# Patient Record
Sex: MALE | Race: WHITE | Employment: FULL TIME | URBAN - NONMETROPOLITAN AREA
[De-identification: names, ages, dates, MRNs, and addresses within clinical notes are randomized per-mention and may not be internally consistent; named-entity substitution may affect disease eponyms.]

---

## 2018-10-08 ENCOUNTER — HOSPITAL ENCOUNTER (EMERGENCY)
Age: 38
Discharge: HOME | End: 2018-10-08
Attending: EMERGENCY MEDICINE

## 2018-10-08 ENCOUNTER — HOSPITAL ENCOUNTER (EMERGENCY)
Age: 38
Discharge: HOME HEALTH | End: 2018-10-08

## 2018-10-08 VITALS
WEIGHT: 165 LBS | BODY MASS INDEX: 23.62 KG/M2 | DIASTOLIC BLOOD PRESSURE: 94 MMHG | SYSTOLIC BLOOD PRESSURE: 130 MMHG | OXYGEN SATURATION: 95 % | HEART RATE: 123 BPM | TEMPERATURE: 99.9 F | RESPIRATION RATE: 13 BRPM | HEIGHT: 70 IN

## 2018-10-08 VITALS
RESPIRATION RATE: 15 BRPM | OXYGEN SATURATION: 98 % | TEMPERATURE: 98.9 F | SYSTOLIC BLOOD PRESSURE: 115 MMHG | DIASTOLIC BLOOD PRESSURE: 63 MMHG | HEART RATE: 84 BPM

## 2018-10-08 DIAGNOSIS — M54.5 LOW BACK PAIN, UNSPECIFIED BACK PAIN LATERALITY, UNSPECIFIED CHRONICITY, WITH SCIATICA PRESENCE UNSPECIFIED: Primary | ICD-10-CM

## 2018-10-08 DIAGNOSIS — F19.10 DRUG ABUSE (HCC): Primary | ICD-10-CM

## 2018-10-08 LAB
ACETAMINOPHEN LEVEL: < 5 UG/ML (ref 0–20)
ALBUMIN SERPL-MCNC: 4.4 G/DL (ref 3.5–5.1)
ALP BLD-CCNC: 96 U/L (ref 38–126)
ALT SERPL-CCNC: 18 U/L (ref 11–66)
AMPHETAMINE+METHAMPHETAMINE URINE SCREEN: NEGATIVE
AMPHETAMINE+METHAMPHETAMINE URINE SCREEN: NEGATIVE
ANION GAP SERPL CALCULATED.3IONS-SCNC: 19 MEQ/L (ref 8–16)
AST SERPL-CCNC: 34 U/L (ref 5–40)
BARBITURATE QUANTITATIVE URINE: NEGATIVE
BARBITURATE QUANTITATIVE URINE: NEGATIVE
BASOPHILS # BLD: 0.3 %
BASOPHILS ABSOLUTE: 0 THOU/MM3 (ref 0–0.1)
BENZODIAZEPINE QUANTITATIVE URINE: NEGATIVE
BENZODIAZEPINE QUANTITATIVE URINE: NEGATIVE
BILIRUB SERPL-MCNC: 0.6 MG/DL (ref 0.3–1.2)
BILIRUBIN DIRECT: < 0.2 MG/DL (ref 0–0.3)
BILIRUBIN URINE: NEGATIVE
BLOOD, URINE: NEGATIVE
BUN BLDV-MCNC: 23 MG/DL (ref 7–22)
CALCIUM SERPL-MCNC: 9.4 MG/DL (ref 8.5–10.5)
CANNABINOID QUANTITATIVE URINE: NEGATIVE
CANNABINOID QUANTITATIVE URINE: NEGATIVE
CHARACTER, URINE: CLEAR
CHLORIDE BLD-SCNC: 100 MEQ/L (ref 98–111)
CO2: 23 MEQ/L (ref 23–33)
COCAINE METABOLITE QUANTITATIVE URINE: POSITIVE
COCAINE METABOLITE QUANTITATIVE URINE: POSITIVE
COLOR: YELLOW
CREAT SERPL-MCNC: 1.4 MG/DL (ref 0.4–1.2)
EKG ATRIAL RATE: 111 BPM
EKG P AXIS: 74 DEGREES
EKG P-R INTERVAL: 114 MS
EKG Q-T INTERVAL: 322 MS
EKG QRS DURATION: 78 MS
EKG QTC CALCULATION (BAZETT): 437 MS
EKG R AXIS: 79 DEGREES
EKG T AXIS: 61 DEGREES
EKG VENTRICULAR RATE: 111 BPM
EOSINOPHIL # BLD: 0.7 %
EOSINOPHILS ABSOLUTE: 0.1 THOU/MM3 (ref 0–0.4)
ERYTHROCYTE [DISTWIDTH] IN BLOOD BY AUTOMATED COUNT: 13.2 % (ref 11.5–14.5)
ERYTHROCYTE [DISTWIDTH] IN BLOOD BY AUTOMATED COUNT: 45.1 FL (ref 35–45)
ETHYL ALCOHOL, SERUM: < 0.01 %
GFR SERPL CREATININE-BSD FRML MDRD: 57 ML/MIN/1.73M2
GLUCOSE BLD-MCNC: 68 MG/DL (ref 70–108)
GLUCOSE URINE: NEGATIVE MG/DL
HCT VFR BLD CALC: 40.2 % (ref 42–52)
HEMOGLOBIN: 13.3 GM/DL (ref 14–18)
IMMATURE GRANS (ABS): 0.05 THOU/MM3 (ref 0–0.07)
IMMATURE GRANULOCYTES: 0.3 %
KETONES, URINE: NEGATIVE
LEUKOCYTE ESTERASE, URINE: NEGATIVE
LIPASE: 11.1 U/L (ref 5.6–51.3)
LYMPHOCYTES # BLD: 16.4 %
LYMPHOCYTES ABSOLUTE: 2.5 THOU/MM3 (ref 1–4.8)
MAGNESIUM: 2.6 MG/DL (ref 1.6–2.4)
MCH RBC QN AUTO: 30.6 PG (ref 26–33)
MCHC RBC AUTO-ENTMCNC: 33.1 GM/DL (ref 32.2–35.5)
MCV RBC AUTO: 92.6 FL (ref 80–94)
MONOCYTES # BLD: 6.8 %
MONOCYTES ABSOLUTE: 1 THOU/MM3 (ref 0.4–1.3)
NITRITE, URINE: NEGATIVE
NUCLEATED RED BLOOD CELLS: 0 /100 WBC
OPIATES, URINE: NEGATIVE
OPIATES, URINE: NEGATIVE
OSMOLALITY CALCULATION: 285.1 MOSMOL/KG (ref 275–300)
OXYCODONE: NEGATIVE
OXYCODONE: NEGATIVE
PH UA: 6
PHENCYCLIDINE QUANTITATIVE URINE: NEGATIVE
PHENCYCLIDINE QUANTITATIVE URINE: NEGATIVE
PLATELET # BLD: 387 THOU/MM3 (ref 130–400)
PMV BLD AUTO: 8.9 FL (ref 9.4–12.4)
POTASSIUM SERPL-SCNC: 4.6 MEQ/L (ref 3.5–5.2)
PROTEIN UA: NEGATIVE
RBC # BLD: 4.34 MILL/MM3 (ref 4.7–6.1)
SALICYLATE, SERUM: < 0.3 MG/DL (ref 2–10)
SEG NEUTROPHILS: 75.5 %
SEGMENTED NEUTROPHILS ABSOLUTE COUNT: 11.6 THOU/MM3 (ref 1.8–7.7)
SODIUM BLD-SCNC: 142 MEQ/L (ref 135–145)
SPECIFIC GRAVITY, URINE: 1.02 (ref 1–1.03)
T4 FREE: 1.18 NG/DL (ref 0.93–1.76)
TOTAL PROTEIN: 7.9 G/DL (ref 6.1–8)
TSH SERPL DL<=0.05 MIU/L-ACNC: 9.92 UIU/ML (ref 0.4–4.2)
UROBILINOGEN, URINE: 0.2 EU/DL
WBC # BLD: 15.3 THOU/MM3 (ref 4.8–10.8)

## 2018-10-08 PROCEDURE — 83690 ASSAY OF LIPASE: CPT

## 2018-10-08 PROCEDURE — 84443 ASSAY THYROID STIM HORMONE: CPT

## 2018-10-08 PROCEDURE — 84439 ASSAY OF FREE THYROXINE: CPT

## 2018-10-08 PROCEDURE — 80307 DRUG TEST PRSMV CHEM ANLYZR: CPT

## 2018-10-08 PROCEDURE — 99284 EMERGENCY DEPT VISIT MOD MDM: CPT

## 2018-10-08 PROCEDURE — 82248 BILIRUBIN DIRECT: CPT

## 2018-10-08 PROCEDURE — G0480 DRUG TEST DEF 1-7 CLASSES: HCPCS

## 2018-10-08 PROCEDURE — 93005 ELECTROCARDIOGRAM TRACING: CPT | Performed by: EMERGENCY MEDICINE

## 2018-10-08 PROCEDURE — 81003 URINALYSIS AUTO W/O SCOPE: CPT

## 2018-10-08 PROCEDURE — 93010 ELECTROCARDIOGRAM REPORT: CPT | Performed by: INTERNAL MEDICINE

## 2018-10-08 PROCEDURE — 36415 COLL VENOUS BLD VENIPUNCTURE: CPT

## 2018-10-08 PROCEDURE — 85025 COMPLETE CBC W/AUTO DIFF WBC: CPT

## 2018-10-08 PROCEDURE — 83735 ASSAY OF MAGNESIUM: CPT

## 2018-10-08 PROCEDURE — 80053 COMPREHEN METABOLIC PANEL: CPT

## 2018-10-08 ASSESSMENT — ENCOUNTER SYMPTOMS
SORE THROAT: 0
BACK PAIN: 1
TROUBLE SWALLOWING: 0
RHINORRHEA: 0
COUGH: 0
CHEST TIGHTNESS: 0
WHEEZING: 0
SHORTNESS OF BREATH: 0

## 2018-10-08 ASSESSMENT — SLEEP AND FATIGUE QUESTIONNAIRES
DO YOU HAVE DIFFICULTY SLEEPING: NO
AVERAGE NUMBER OF SLEEP HOURS: 6
DO YOU USE A SLEEP AID: NO

## 2018-10-08 ASSESSMENT — LIFESTYLE VARIABLES: HISTORY_ALCOHOL_USE: YES

## 2018-10-08 NOTE — PROGRESS NOTES
Provisional Diagnosis:   Unspecified Depressive Disorder    Psychosocial and Contextual Factors:   Substance use. C-SSRS Summary:      Patient: xxxx  Family:   Agency: [address] written by [facility] AND [facility]  Substance Abuse   xxxx    Present Suicidal Behavior:      Verbal: Denies    Attempt: Denies    Past Suicidal Behavior:     Verbal:  Unknown if two overdoses were intentional 2/17   &  6/17. Attempt: UTD      Self-Injurious/Self-Mutilation: Denies    Trauma Identified:  Denies      Protective Factors:    Employment    Risk Factors:    Substance use    Clinical Summary:    Patient presents under [address] status written by SHERIE NEGRETE [facility]. Per [facility]: 'deputies were dispatched for a male acting erratically. Mother stated her son Dylan Schmidt was acting eratically and stated she overheard him saying he wanted to kill himself. Dylan Schmidt is acting manic and deputies were concerned for his safety. Patient is cooperative with assessment however appears hyper constantly moving around and speech is fast. Patient has not appeared to slow down since arrival . 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 Patient reports he enjoys working. Patient denies delusions/hallucinations. Patient denies suicidal /homicidal ideation, thought or plan. Patient reports use of Cocaine and alcohol but occasionally. Call placed to patients mother Nathanael Merrill. Patient signed a release of information to speak with his mother. Diana Ramesh reports 'no he did not say anything like that' maybe he was singing a song' He loves his baby to ever  hurt himself'. 01:30 Pt pacing in room  02:30 Pt. Talking with others. 03:30 Patient is placing in room  04:30 Patient is sitting in bed  05:30 Patient is talking to other patient. 06:00 Waiting for discharge. Level of Care Disposition:    Consulted with Rock Bipin MARTIN concerning the mental status of patient.      Insurance Precertification Authorization:

## 2018-10-08 NOTE — ED PROVIDER NOTES
(Please note that portions of this note were completed with a voice recognition program.  Efforts were made to edit the dictations but occasionally words are mis-transcribed.)    BONNIE Gottlieb CNP, APRN - CNP  10/08/18 0559

## 2019-11-15 ENCOUNTER — HOSPITAL ENCOUNTER (EMERGENCY)
Age: 39
Discharge: HOME | End: 2019-11-15
Payer: MEDICAID

## 2019-11-15 VITALS
BODY MASS INDEX: 20.76 KG/M2 | WEIGHT: 145 LBS | TEMPERATURE: 98.3 F | HEIGHT: 70 IN | DIASTOLIC BLOOD PRESSURE: 61 MMHG | RESPIRATION RATE: 18 BRPM | SYSTOLIC BLOOD PRESSURE: 115 MMHG | HEART RATE: 67 BPM | OXYGEN SATURATION: 96 %

## 2019-11-15 DIAGNOSIS — T40.601A OPIATE OVERDOSE, ACCIDENTAL OR UNINTENTIONAL, INITIAL ENCOUNTER (HCC): Primary | ICD-10-CM

## 2019-11-15 PROCEDURE — 99284 EMERGENCY DEPT VISIT MOD MDM: CPT

## 2019-11-15 ASSESSMENT — ENCOUNTER SYMPTOMS
DIARRHEA: 0
RHINORRHEA: 0
PHOTOPHOBIA: 0
NAUSEA: 0
BACK PAIN: 0
EYE REDNESS: 0
SORE THROAT: 0
VOMITING: 0
COUGH: 0
BLOOD IN STOOL: 0
WHEEZING: 0
CONSTIPATION: 0
SHORTNESS OF BREATH: 0
SINUS PRESSURE: 0
ABDOMINAL PAIN: 0
VOICE CHANGE: 0
CHEST TIGHTNESS: 0
ABDOMINAL DISTENTION: 0
COLOR CHANGE: 0

## 2020-06-24 ENCOUNTER — HOSPITAL ENCOUNTER (EMERGENCY)
Age: 40
Discharge: HOME | End: 2020-06-25
Attending: EMERGENCY MEDICINE
Payer: MEDICAID

## 2020-06-24 LAB
ALBUMIN SERPL-MCNC: 4.5 G/DL (ref 3.5–5.1)
ALP BLD-CCNC: 97 U/L (ref 38–126)
ALT SERPL-CCNC: 45 U/L (ref 11–66)
ANION GAP SERPL CALCULATED.3IONS-SCNC: 28 MEQ/L (ref 8–16)
AST SERPL-CCNC: 70 U/L (ref 5–40)
BASOPHILS # BLD: 0.5 %
BASOPHILS ABSOLUTE: 0 THOU/MM3 (ref 0–0.1)
BILIRUB SERPL-MCNC: 0.4 MG/DL (ref 0.3–1.2)
BUN BLDV-MCNC: 12 MG/DL (ref 7–22)
CALCIUM SERPL-MCNC: 9.1 MG/DL (ref 8.5–10.5)
CHLORIDE BLD-SCNC: 102 MEQ/L (ref 98–111)
CO2: 13 MEQ/L (ref 23–33)
CREAT SERPL-MCNC: 1.2 MG/DL (ref 0.4–1.2)
EKG ATRIAL RATE: 121 BPM
EKG P AXIS: 68 DEGREES
EKG P-R INTERVAL: 116 MS
EKG Q-T INTERVAL: 308 MS
EKG QRS DURATION: 84 MS
EKG QTC CALCULATION (BAZETT): 437 MS
EKG R AXIS: 72 DEGREES
EKG T AXIS: 65 DEGREES
EKG VENTRICULAR RATE: 121 BPM
EOSINOPHIL # BLD: 1.5 %
EOSINOPHILS ABSOLUTE: 0.1 THOU/MM3 (ref 0–0.4)
ERYTHROCYTE [DISTWIDTH] IN BLOOD BY AUTOMATED COUNT: 12.4 % (ref 11.5–14.5)
ERYTHROCYTE [DISTWIDTH] IN BLOOD BY AUTOMATED COUNT: 46.5 FL (ref 35–45)
ETHYL ALCOHOL, SERUM: 0.1 %
GFR SERPL CREATININE-BSD FRML MDRD: 67 ML/MIN/1.73M2
GLUCOSE BLD-MCNC: 145 MG/DL (ref 70–108)
HCT VFR BLD CALC: 46.1 % (ref 42–52)
HEMOGLOBIN: 15.2 GM/DL (ref 14–18)
IMMATURE GRANS (ABS): 0.04 THOU/MM3 (ref 0–0.07)
IMMATURE GRANULOCYTES: 0.5 %
LIPASE: 40.8 U/L (ref 5.6–51.3)
LYMPHOCYTES # BLD: 34.9 %
LYMPHOCYTES ABSOLUTE: 3.1 THOU/MM3 (ref 1–4.8)
MAGNESIUM: 2.7 MG/DL (ref 1.6–2.4)
MCH RBC QN AUTO: 33.8 PG (ref 26–33)
MCHC RBC AUTO-ENTMCNC: 33 GM/DL (ref 32.2–35.5)
MCV RBC AUTO: 102.4 FL (ref 80–94)
MONOCYTES # BLD: 8.3 %
MONOCYTES ABSOLUTE: 0.7 THOU/MM3 (ref 0.4–1.3)
NUCLEATED RED BLOOD CELLS: 0 /100 WBC
OSMOLALITY CALCULATION: 287.3 MOSMOL/KG (ref 275–300)
PLATELET # BLD: 353 THOU/MM3 (ref 130–400)
PMV BLD AUTO: 9.3 FL (ref 9.4–12.4)
POTASSIUM SERPL-SCNC: 3.9 MEQ/L (ref 3.5–5.2)
RBC # BLD: 4.5 MILL/MM3 (ref 4.7–6.1)
SEG NEUTROPHILS: 54.3 %
SEGMENTED NEUTROPHILS ABSOLUTE COUNT: 4.8 THOU/MM3 (ref 1.8–7.7)
SODIUM BLD-SCNC: 143 MEQ/L (ref 135–145)
TOTAL PROTEIN: 7.8 G/DL (ref 6.1–8)
TROPONIN T: < 0.01 NG/ML
TSH SERPL DL<=0.05 MIU/L-ACNC: 13.53 UIU/ML (ref 0.4–4.2)
WBC # BLD: 8.9 THOU/MM3 (ref 4.8–10.8)

## 2020-06-24 PROCEDURE — G0480 DRUG TEST DEF 1-7 CLASSES: HCPCS

## 2020-06-24 PROCEDURE — 83690 ASSAY OF LIPASE: CPT

## 2020-06-24 PROCEDURE — 84443 ASSAY THYROID STIM HORMONE: CPT

## 2020-06-24 PROCEDURE — 83735 ASSAY OF MAGNESIUM: CPT

## 2020-06-24 PROCEDURE — 85025 COMPLETE CBC W/AUTO DIFF WBC: CPT

## 2020-06-24 PROCEDURE — 93005 ELECTROCARDIOGRAM TRACING: CPT | Performed by: EMERGENCY MEDICINE

## 2020-06-24 PROCEDURE — 80053 COMPREHEN METABOLIC PANEL: CPT

## 2020-06-24 PROCEDURE — 36415 COLL VENOUS BLD VENIPUNCTURE: CPT

## 2020-06-24 PROCEDURE — 99284 EMERGENCY DEPT VISIT MOD MDM: CPT

## 2020-06-24 PROCEDURE — 84484 ASSAY OF TROPONIN QUANT: CPT

## 2020-06-24 ASSESSMENT — ENCOUNTER SYMPTOMS
SINUS PRESSURE: 0
ABDOMINAL DISTENTION: 0
EYE ITCHING: 0
BACK PAIN: 0
NAUSEA: 0
RHINORRHEA: 0
COUGH: 0
CHEST TIGHTNESS: 0
WHEEZING: 0
DIARRHEA: 0
CONSTIPATION: 0
TROUBLE SWALLOWING: 0
SHORTNESS OF BREATH: 0
CHOKING: 0
EYE DISCHARGE: 0
SORE THROAT: 0
PHOTOPHOBIA: 0
EYE PAIN: 0
ABDOMINAL PAIN: 0
BLOOD IN STOOL: 0
VOICE CHANGE: 0
VOMITING: 0
EYE REDNESS: 0

## 2020-06-24 ASSESSMENT — SLEEP AND FATIGUE QUESTIONNAIRES
DO YOU USE A SLEEP AID: NO
DO YOU HAVE DIFFICULTY SLEEPING: NO
AVERAGE NUMBER OF SLEEP HOURS: 6

## 2020-06-25 VITALS
WEIGHT: 155 LBS | DIASTOLIC BLOOD PRESSURE: 93 MMHG | RESPIRATION RATE: 20 BRPM | HEIGHT: 70 IN | BODY MASS INDEX: 22.19 KG/M2 | OXYGEN SATURATION: 95 % | TEMPERATURE: 98.9 F | HEART RATE: 95 BPM | SYSTOLIC BLOOD PRESSURE: 115 MMHG

## 2020-06-25 LAB
AMPHETAMINE+METHAMPHETAMINE URINE SCREEN: NEGATIVE
BACTERIA: ABNORMAL /HPF
BARBITURATE QUANTITATIVE URINE: NEGATIVE
BENZODIAZEPINE QUANTITATIVE URINE: NEGATIVE
BILIRUBIN URINE: NEGATIVE
BLOOD, URINE: ABNORMAL
CANNABINOID QUANTITATIVE URINE: NEGATIVE
CASTS 2: ABNORMAL /LPF
CASTS UA: ABNORMAL /LPF
CHARACTER, URINE: CLEAR
COCAINE METABOLITE QUANTITATIVE URINE: NEGATIVE
COLOR: YELLOW
CRYSTALS, UA: ABNORMAL
EPITHELIAL CELLS, UA: ABNORMAL /HPF
GLUCOSE URINE: NEGATIVE MG/DL
KETONES, URINE: NEGATIVE
LEUKOCYTE ESTERASE, URINE: NEGATIVE
MISCELLANEOUS 2: ABNORMAL
NITRITE, URINE: NEGATIVE
OPIATES, URINE: NEGATIVE
OXYCODONE: NEGATIVE
PH UA: 6 (ref 5–9)
PHENCYCLIDINE QUANTITATIVE URINE: NEGATIVE
PROTEIN UA: 100
RBC URINE: ABNORMAL /HPF
RENAL EPITHELIAL, UA: ABNORMAL
SPECIFIC GRAVITY, URINE: 1.01 (ref 1–1.03)
UROBILINOGEN, URINE: 0.2 EU/DL (ref 0–1)
WBC UA: ABNORMAL /HPF
YEAST: ABNORMAL

## 2020-06-25 PROCEDURE — 93010 ELECTROCARDIOGRAM REPORT: CPT | Performed by: INTERNAL MEDICINE

## 2020-06-25 PROCEDURE — 2580000003 HC RX 258: Performed by: EMERGENCY MEDICINE

## 2020-06-25 PROCEDURE — 81001 URINALYSIS AUTO W/SCOPE: CPT

## 2020-06-25 PROCEDURE — 80307 DRUG TEST PRSMV CHEM ANLYZR: CPT

## 2020-06-25 RX ORDER — 0.9 % SODIUM CHLORIDE 0.9 %
1000 INTRAVENOUS SOLUTION INTRAVENOUS ONCE
Status: COMPLETED | OUTPATIENT
Start: 2020-06-25 | End: 2020-06-25

## 2020-06-25 RX ADMIN — SODIUM CHLORIDE 1000 ML: 9 INJECTION, SOLUTION INTRAVENOUS at 00:51

## 2020-06-25 NOTE — ED PROVIDER NOTES
[facility]  eMERGENCY dEPARTMENT eNCOUnter          CHIEF COMPLAINT       Chief Complaint   Patient presents with    Alcohol Intoxication       Nurses Notes reviewed and I agree except as noted in the HPI. HISTORY OF PRESENT ILLNESS    Sabina Rios is a 44 y.o. male who presents with police for public intoxication. Apparently the patient was outside his house yelling and screaming. He admits to drinking a lot of alcohol the day. He states that he took 2 \"pain pills\", but he was unsure what they were. At bedside the patient is awake alert able to move all extremities very animated and allowed. He is not aggressive at this point. He is cooperative with examination. He states he is not quite sure what it was that he took. He is not complaining of any physical complaints at this time. He is thirsty. Patient is diaphoretic at bedside however he is not complaining of any chest pain or shortness of breath. Currently the patient is resting comfortably on the cot no apparent distress very animated and loud. Patient denies any suicidal or homicidal ideation. REVIEW OF SYSTEMS     Review of Systems   Constitutional: Negative for activity change, appetite change, diaphoresis, fatigue and unexpected weight change. Intoxicated very animated and loud   HENT: Negative for congestion, ear discharge, ear pain, hearing loss, rhinorrhea, sinus pressure, sore throat, trouble swallowing and voice change. Eyes: Negative for photophobia, pain, discharge, redness and itching. Respiratory: Negative for cough, choking, chest tightness, shortness of breath and wheezing. Cardiovascular: Negative for chest pain, palpitations and leg swelling. Gastrointestinal: Negative for abdominal distention, abdominal pain, blood in stool, constipation, diarrhea, nausea and vomiting. Endocrine: Negative for polydipsia, polyphagia and polyuria.    Genitourinary: Negative for decreased urine volume, difficulty urinating, dysuria, enuresis, frequency, hematuria and urgency. Musculoskeletal: Negative for arthralgias, back pain, gait problem, myalgias, neck pain and neck stiffness. Skin: Negative for pallor and rash. Allergic/Immunologic: Negative for immunocompromised state. Neurological: Negative for dizziness, tremors, seizures, syncope, facial asymmetry, weakness, light-headedness, numbness and headaches. Hematological: Negative for adenopathy. Does not bruise/bleed easily. Psychiatric/Behavioral: Negative for agitation, hallucinations and suicidal ideas. The patient is not nervous/anxious. PAST MEDICAL HISTORY    has no past medical history on file. SURGICAL HISTORY      has no past surgical history on file. CURRENT MEDICATIONS       Previous Medications    No medications on file       ALLERGIES     has No Known Allergies. FAMILY HISTORY     has no family status information on file. family history is not on file. SOCIAL HISTORY      reports that he has been smoking. He has been smoking about 0.50 packs per day. He has never used smokeless tobacco. He reports current alcohol use. He reports current drug use. PHYSICAL EXAM     INITIAL VITALS:  height is 5' 10\" (1.778 m) and weight is 155 lb (70.3 kg). His oral temperature is 98.9 °F (37.2 °C). His blood pressure is 115/93 (abnormal) and his pulse is 95. His respiration is 20 and oxygen saturation is 95%. Physical Exam  Vitals signs and nursing note reviewed. Constitutional:       General: He is not in acute distress. Appearance: He is well-developed. He is not diaphoretic. HENT:      Head: Normocephalic and atraumatic. Right Ear: External ear normal.      Left Ear: External ear normal.      Nose: Nose normal.   Eyes:      General: Lids are normal. No scleral icterus. Right eye: No discharge. Left eye: No discharge. Conjunctiva/sclera: Conjunctivae normal.      Right eye: No exudate.

## 2020-06-25 NOTE — ED NOTES
Pt repeatedly makes weird noises. Pt given water at this time. Pt remains diaphoretic and tachycardic.      Sean Knight RN  06/24/20 0480

## 2020-06-25 NOTE — ED NOTES
Bed: 004A  Expected date:   Expected time:   Means of arrival: [facility]  Comments:     Yanick Jeter RN  06/24/20 6333

## 2020-09-04 ENCOUNTER — HOSPITAL ENCOUNTER (EMERGENCY)
Age: 40
Discharge: HOME | End: 2020-09-05
Attending: EMERGENCY MEDICINE
Payer: MEDICAID

## 2020-09-04 LAB
AMPHETAMINE+METHAMPHETAMINE URINE SCREEN: NEGATIVE
BARBITURATE QUANTITATIVE URINE: NEGATIVE
BASOPHILS # BLD: 0.3 %
BASOPHILS ABSOLUTE: 0 THOU/MM3 (ref 0–0.1)
BENZODIAZEPINE QUANTITATIVE URINE: NEGATIVE
CANNABINOID QUANTITATIVE URINE: NEGATIVE
COCAINE METABOLITE QUANTITATIVE URINE: POSITIVE
EKG ATRIAL RATE: 88 BPM
EKG P AXIS: 73 DEGREES
EKG P-R INTERVAL: 128 MS
EKG Q-T INTERVAL: 386 MS
EKG QRS DURATION: 96 MS
EKG QTC CALCULATION (BAZETT): 467 MS
EKG R AXIS: 74 DEGREES
EKG T AXIS: 54 DEGREES
EKG VENTRICULAR RATE: 88 BPM
EOSINOPHIL # BLD: 0.1 %
EOSINOPHILS ABSOLUTE: 0 THOU/MM3 (ref 0–0.4)
ERYTHROCYTE [DISTWIDTH] IN BLOOD BY AUTOMATED COUNT: 12.4 % (ref 11.5–14.5)
ERYTHROCYTE [DISTWIDTH] IN BLOOD BY AUTOMATED COUNT: 46.6 FL (ref 35–45)
GLUCOSE BLD-MCNC: 139 MG/DL (ref 70–108)
HCT VFR BLD CALC: 43 % (ref 42–52)
HEMOGLOBIN: 14.2 GM/DL (ref 14–18)
IMMATURE GRANS (ABS): 0.02 THOU/MM3 (ref 0–0.07)
IMMATURE GRANULOCYTES: 0.3 %
LYMPHOCYTES # BLD: 15.5 %
LYMPHOCYTES ABSOLUTE: 1.1 THOU/MM3 (ref 1–4.8)
MCH RBC QN AUTO: 33.9 PG (ref 26–33)
MCHC RBC AUTO-ENTMCNC: 33 GM/DL (ref 32.2–35.5)
MCV RBC AUTO: 102.6 FL (ref 80–94)
MONOCYTES # BLD: 4.8 %
MONOCYTES ABSOLUTE: 0.4 THOU/MM3 (ref 0.4–1.3)
NUCLEATED RED BLOOD CELLS: 0 /100 WBC
OPIATES, URINE: NEGATIVE
OXYCODONE: NEGATIVE
PHENCYCLIDINE QUANTITATIVE URINE: NEGATIVE
PLATELET # BLD: 317 THOU/MM3 (ref 130–400)
PMV BLD AUTO: 9 FL (ref 9.4–12.4)
RBC # BLD: 4.19 MILL/MM3 (ref 4.7–6.1)
SEG NEUTROPHILS: 79 %
SEGMENTED NEUTROPHILS ABSOLUTE COUNT: 5.8 THOU/MM3 (ref 1.8–7.7)
WBC # BLD: 7.3 THOU/MM3 (ref 4.8–10.8)

## 2020-09-04 PROCEDURE — G0480 DRUG TEST DEF 1-7 CLASSES: HCPCS

## 2020-09-04 PROCEDURE — 82948 REAGENT STRIP/BLOOD GLUCOSE: CPT

## 2020-09-04 PROCEDURE — 99284 EMERGENCY DEPT VISIT MOD MDM: CPT

## 2020-09-04 PROCEDURE — 2580000003 HC RX 258: Performed by: EMERGENCY MEDICINE

## 2020-09-04 PROCEDURE — 36415 COLL VENOUS BLD VENIPUNCTURE: CPT

## 2020-09-04 PROCEDURE — 80307 DRUG TEST PRSMV CHEM ANLYZR: CPT

## 2020-09-04 PROCEDURE — 85025 COMPLETE CBC W/AUTO DIFF WBC: CPT

## 2020-09-04 PROCEDURE — 99285 EMERGENCY DEPT VISIT HI MDM: CPT

## 2020-09-04 PROCEDURE — 80048 BASIC METABOLIC PNL TOTAL CA: CPT

## 2020-09-04 PROCEDURE — 93005 ELECTROCARDIOGRAM TRACING: CPT | Performed by: EMERGENCY MEDICINE

## 2020-09-04 RX ORDER — 0.9 % SODIUM CHLORIDE 0.9 %
1000 INTRAVENOUS SOLUTION INTRAVENOUS ONCE
Status: COMPLETED | OUTPATIENT
Start: 2020-09-04 | End: 2020-09-04

## 2020-09-04 RX ADMIN — SODIUM CHLORIDE 1000 ML: 9 INJECTION, SOLUTION INTRAVENOUS at 21:20

## 2020-09-04 ASSESSMENT — ENCOUNTER SYMPTOMS
ABDOMINAL PAIN: 0
DIARRHEA: 0
VOMITING: 0
CONSTIPATION: 0
COUGH: 0
SHORTNESS OF BREATH: 0
RHINORRHEA: 0
NAUSEA: 0
SORE THROAT: 0

## 2020-09-05 VITALS
RESPIRATION RATE: 16 BRPM | WEIGHT: 150 LBS | BODY MASS INDEX: 21.47 KG/M2 | OXYGEN SATURATION: 98 % | TEMPERATURE: 98.3 F | HEIGHT: 70 IN | DIASTOLIC BLOOD PRESSURE: 69 MMHG | HEART RATE: 56 BPM | SYSTOLIC BLOOD PRESSURE: 115 MMHG

## 2020-09-05 LAB
ANION GAP SERPL CALCULATED.3IONS-SCNC: 8 MEQ/L (ref 8–16)
BUN BLDV-MCNC: 6 MG/DL (ref 7–22)
CALCIUM SERPL-MCNC: 8.1 MG/DL (ref 8.5–10.5)
CHLORIDE BLD-SCNC: 108 MEQ/L (ref 98–111)
CO2: 24 MEQ/L (ref 23–33)
CREAT SERPL-MCNC: 0.8 MG/DL (ref 0.4–1.2)
ETHYL ALCOHOL, SERUM: 0.04 %
ETHYL ALCOHOL, SERUM: 0.12 %
GFR SERPL CREATININE-BSD FRML MDRD: > 90 ML/MIN/1.73M2
GLUCOSE BLD-MCNC: 104 MG/DL (ref 70–108)
OSMOLALITY CALCULATION: 277.3 MOSMOL/KG (ref 275–300)
POTASSIUM REFLEX MAGNESIUM: 4.3 MEQ/L (ref 3.5–5.2)
SODIUM BLD-SCNC: 140 MEQ/L (ref 135–145)

## 2020-09-05 PROCEDURE — G0480 DRUG TEST DEF 1-7 CLASSES: HCPCS

## 2020-09-05 PROCEDURE — 93010 ELECTROCARDIOGRAM REPORT: CPT | Performed by: INTERNAL MEDICINE

## 2020-09-05 PROCEDURE — 36415 COLL VENOUS BLD VENIPUNCTURE: CPT

## 2020-09-05 NOTE — ED NOTES
Pt updated on need for urine. Verbalized understanding. Urinal at bedside. Will continue to monitor.       Armando Cardona RN  09/04/20 7425

## 2020-09-05 NOTE — ED PROVIDER NOTES
[facility] [place]          CHIEF COMPLAINT       Chief Complaint   Patient presents with    Drug Overdose       Nurses Notes reviewed and I agree except as noted in the HPI. HISTORY OF PRESENT ILLNESS    Gabriella Bobo is a 36 y.o. male who presents the emergency department after reported overdose. Patient reports \"apparently I passed out in the bathroom at my house a little bit ago\". He states that he was told by a medic who is on the scene that he was given Narcan which woke him up. He states that \"I was snorting a pill, just one I thought it was Percocet\". He denies regular opiate or heroin use. He also states that he drank 24 ounces of beer, last drink was probably 730 to 8 PM.  He denies any other illicit or prescription drug use. He denies daily street drug use. Denies IV drug use. He denies suicidal ideation, homicidal ideation, auditory or visual hallucinations. He states that he has never been treated for drug or alcohol addiction. He is not on any prescription or over-the-counter medications and did not take any today. He denies snorting Percocet in the past.  He denies chest or abdominal pain, headache, nausea, vomiting, diarrhea, fever, cough, or any other complaints or concerns. REVIEW OF SYSTEMS     Review of Systems   Constitutional: Negative for diaphoresis and fever. HENT: Negative for congestion, rhinorrhea and sore throat. Eyes: Negative for visual disturbance. Respiratory: Negative for cough and shortness of breath. Cardiovascular: Negative for chest pain, palpitations and leg swelling. Gastrointestinal: Negative for abdominal pain, constipation, diarrhea, nausea and vomiting. Endocrine: Negative for polyuria. Genitourinary: Negative for dysuria. Musculoskeletal: Negative for joint swelling. Skin: Negative for rash.    Neurological: Negative for dizziness, seizures, syncope, speech difficulty, weakness, numbness and headaches. Hematological: Negative for adenopathy. Psychiatric/Behavioral: Negative for confusion, hallucinations and self-injury. All other systems reviewed and are negative. PAST MEDICAL HISTORY    has no past medical history on file. SURGICAL HISTORY      has no past surgical history on file. CURRENT MEDICATIONS       Previous Medications    No medications on file       ALLERGIES     has No Known Allergies. FAMILY HISTORY     has no family status information on file. family history is not on file. SOCIAL HISTORY      reports that he has been smoking. He has been smoking about 0.50 packs per day. He has never used smokeless tobacco. He reports current alcohol use. He reports current drug use. PHYSICAL EXAM     INITIAL VITALS:  height is 5' 10\" (1.778 m) and weight is 150 lb (68 kg). His temperature is 98.3 °F (36.8 °C). His blood pressure is 110/77 and his pulse is 53. His respiration is 16 and oxygen saturation is 97%. CONSTITUTIONAL: [Awake, alert, non toxic, well developed, well nourished, no acute distress]  HEAD: [Normocephalic, atraumatic]  EYES: [Pupils equal, round & reactive to light, extraocular movements intact, no nystagmus, clear conjunctiva, non-icteric sclera]  ENT: [External ear canal clear without evidence of cerumen impaction or foreign body, TM's clear without erythema or bulging. Nares patent without drainage, septum appears midline. Moist mucus membranes, oropharynx clear without exudate, erythema, or mass. Uvula midline]  NECK: [Nontender and supple. No meningismus, no appreciated lymphadenopathy. Intact full range of motion. C-spine midline without vertebral tenderness. Trachea midline.]  CHEST: [Inspection normal, no lesions, equal rise. No crepitus or tenderness upon palpation.]  CARDIOVASCULAR: [Regular rate, rhythm, normal S1 and S2. No appreciated murmurs, rubs, or gallops. No pulse deficits appreciated. Intact distal perfusion.  JVD not appreciated.]  PULMONARY: [Respiratory distress absent. Respiratory effort normal. Breath sounds clear to auscultation without rhonchi, rales, or wheezing. No accessory muscle use. No stridor]  ABDOMEN: [Inspection normal, without surgical scars. Soft, non-tender, non-distended, with normoactive bowel sounds. No palpable masses, rebound, or guarding]  BACK: [Intact ROM. No midline vertebral tenderness, step off, or crepitus. No CVA tenderness.]  MUSCULOSKELETAL: [Extremities nontender to palpation. No gross deformity or evidence of external trauma. Intact range of motion. Sensation intact. No clubbing, cyanosis, or edema.]  SKIN: [Warm, dry. No jaundice, rash, urticaria, or petechiae]  NEUROLOGIC: [Alert and oriented x 3, GCS 15, normal mentation for age. Moves all four extremities. No gross sensory deficit. Cerebellar function grossly normal.]  PSYCHIATRIC: [Normal mood and affect, thought process is clear and linear]     DIFFERENTIAL DIAGNOSIS:   Differential Dx Lists - I consider the following to be a partial list of the possible etiologies for the patient's symptoms and based on my clinical findings as well and are part of my medical decision making:    Drug: illicit drug overdose likely opiate, other illicit drug overdose, etoh intoxication, less likely: beta-blocker, acute digitalis toxicity, ACEI, ARBs, NSAIDs, Tylenol, iron, polypharmacy, overdose on home prescription medication    DIAGNOSTIC RESULTS     EKG: All EKG's are interpreted by the Emergency Department Physician who either signs or Co-signsthis chart in the absence of a cardiologist.  EKG shows normal sinus rhythm at a rate of 88 bpm.  WV interval 128 ms, QRS duration 96 ms,  ms. No ST elevation or depression, no interpretation.     RADIOLOGY: non-plain film images(s) such as CT,Ultrasound and MRI are read by the radiologist.    No orders to display     [] Visualized and interpreted by me   [] Radiologist's Wet Read Report Reviewed   [] Discussed withRadiologist.    LABS:   Labs Reviewed   CBC WITH AUTO DIFFERENTIAL - Abnormal; Notable for the following components:       Result Value    RBC 4.19 (*)     .6 (*)     MCH 33.9 (*)     RDW-SD 46.6 (*)     MPV 9.0 (*)     All other components within normal limits   BASIC METABOLIC PANEL W/ REFLEX TO MG FOR LOW K - Abnormal; Notable for the following components:    BUN 6 (*)     Calcium 8.1 (*)     All other components within normal limits   POCT GLUCOSE - Abnormal; Notable for the following components:    POC Glucose 139 (*)     All other components within normal limits   URINE DRUG SCREEN   ETHANOL   ANION GAP   OSMOLALITY   GLOMERULAR FILTRATION RATE, ESTIMATED   ETHANOL       EMERGENCY DEPARTMENT COURSE:   Vitals:    Vitals:    09/04/20 2147 09/04/20 2337 09/05/20 0032 09/05/20 0128   BP: 109/75 121/74 107/66 110/77   Pulse: 81 78 64 53   Resp: 16 16 16 16   Temp:       SpO2: 96% 95% 100% 97%   Weight:       Height:         The results of pertinent diagnostic studies and exam findings were discussed. The patients provisional diagnosis and plan of care were discussed with the patient and present family. The patient and/or present family expressed understanding of the diagnosis and plan. The nurse was instructed to provide written instructions and appropriate follow-up information. The patient understands their need and responsibility to obtain additional follow-up as instructed. The patient is comfortable with the plan and discharge. The risks of medications administered and prescribed were discussed with the patient and family present. Patient has repeat ETOH level pending. He is ambulatory in ED and well appearing. He has no sober adult to accompany him home. Once he is sober he can be discharged; care turned over to Dr. Yousuf Avalos pending repeat etoh.     CRITICAL CARE:   none    CONSULTS:  GARRY: patient seen and provided with resources, no SI, reports recreational drug use with accidental

## 2020-09-05 NOTE — ED TRIAGE NOTES
Pt arrived by [facility]. Pt found face down in bathroom. Pt received 6 Narcan. Pt responded to narcan. Pt found with a needle. Pt reports he was snorting percocet. Pt alert and oriented at this time. Breathing spontaneous. EKG complete. Accucheck 139.  Dr. Royal Bis at bedside

## 2020-09-05 NOTE — ED NOTES
Pt given turkey sandwich. Denies any further needs. Will continue to monitor.       Del Aguilar RN  09/05/20 2959

## 2020-09-05 NOTE — ED NOTES
Pt updated on need for ride to discharge. Pt upset due to not being able to call a cab. Pt made aware of ETOH level. Pt verbalized understanding but got angry with this nurse and threw self in bed and crossed arms. Pt made aware that after ETOH redraw pt could be discharged. Will continue to monitor.       Inocencio Alfaro RN  09/05/20 9445

## 2020-09-05 NOTE — PROGRESS NOTES
Patient provided resources for mental health/substance use treatment. Patient is strongly encouraged to follow through with referrals.

## 2020-09-05 NOTE — ED NOTES
Bed: 020A  Expected date:   Expected time:   Means of arrival: [facility]  Comments:     Diane Schaefer RN  09/04/20 2052

## 2020-09-05 NOTE — ED NOTES
Discontinue IV put up walking around room. Pt updated on poc. Call light in reach. Will continue to monitor.       Sim Gomez RN  09/04/20 6367

## 2020-09-05 NOTE — ED PROVIDER NOTES
Transfer of Care Note:   Physician Signing out:  Kamla Witt  Receiving Physician: Vidya Moon M.D. Sign out time: 0200      Brief history:  Alcohol intoxication, patient with potential additional overdose, not detected on toxicology screen but responded to Narcan given by EMS. Items pending that need to be checked:  Sobriety, recheck alcohol level      Tentative Impression of patient:  1. Acute alcohol intoxication  2. Possible acute intoxication    Expected disposition of patient:  Pending results, discharged. Additional Assessment and results:   I have personally performed a face to face diagnostic evaluation on this patient. The patient's initial evaluation and plan have been discussed with the prior physician who initially evaluated the patient. Nursing Notes, Past Medical Hx, Past Surgical Hx, Social Hx, Allergies, vital signs and Family Hx were all reviewed. Vitals:    09/05/20 0220   BP: 115/69   Pulse: 56   Resp: 16   Temp:    SpO2: 98%     Physical Exam      Labs Reviewed   CBC WITH AUTO DIFFERENTIAL - Abnormal; Notable for the following components:       Result Value    RBC 4.19 (*)     .6 (*)     MCH 33.9 (*)     RDW-SD 46.6 (*)     MPV 9.0 (*)     All other components within normal limits   BASIC METABOLIC PANEL W/ REFLEX TO MG FOR LOW K - Abnormal; Notable for the following components:    BUN 6 (*)     Calcium 8.1 (*)     All other components within normal limits   POCT GLUCOSE - Abnormal; Notable for the following components:    POC Glucose 139 (*)     All other components within normal limits   URINE DRUG SCREEN   ETHANOL   ANION GAP   OSMOLALITY   GLOMERULAR FILTRATION RATE, ESTIMATED   ETHANOL         Medications   0.9 % sodium chloride bolus (0 mLs Intravenous Stopped 9/4/20 2316)         No orders to display         ED Course as of Sep 05 0303   Sat Sep 05, 2020   0300 Patient fully awake, steady, able to ambulate with narrow-based steady gait.    Will discharge    [DT] ED Course User Index  [DT] Wolfgang Loaiza MD         Further MDM and disposition:   Assessment: Acute alcohol intoxication. Plan: Patient now clinically sober, will discharge. Final diagnoses:   Opiate overdose, accidental or unintentional, initial encounter ([facility] [address]   Acute alcoholic intoxication without complication ([facility] [address]     New Prescriptions    No medications on file         Condition: condition: good  Dispo: Discharge to home    This transcription was electronically signed. It was dictated by use of voice recognition software and electronically transcribed. The transcription may contain errors not detected in proofreading.         Wolfgang Loaiza MD  09/05/20 9806

## 2021-05-08 ENCOUNTER — HOSPITAL ENCOUNTER (EMERGENCY)
Age: 41
Discharge: HOME | End: 2021-05-08
Attending: EMERGENCY MEDICINE
Payer: MEDICAID

## 2021-05-08 ENCOUNTER — APPOINTMENT (OUTPATIENT)
Dept: GENERAL RADIOLOGY | Age: 41
End: 2021-05-08
Payer: MEDICAID

## 2021-05-08 VITALS
TEMPERATURE: 98.3 F | WEIGHT: 150 LBS | RESPIRATION RATE: 17 BRPM | DIASTOLIC BLOOD PRESSURE: 77 MMHG | SYSTOLIC BLOOD PRESSURE: 111 MMHG | HEART RATE: 79 BPM | OXYGEN SATURATION: 97 % | HEIGHT: 70 IN | BODY MASS INDEX: 21.47 KG/M2

## 2021-05-08 DIAGNOSIS — T40.604A OPIATE OVERDOSE, UNDETERMINED INTENT, INITIAL ENCOUNTER (HCC): Primary | ICD-10-CM

## 2021-05-08 DIAGNOSIS — F10.10 ALCOHOL USE DISORDER, MILD, ABUSE: ICD-10-CM

## 2021-05-08 LAB
ACETAMINOPHEN LEVEL: < 5 UG/ML (ref 0–20)
AMPHETAMINE+METHAMPHETAMINE URINE SCREEN: NEGATIVE
ANION GAP SERPL CALCULATED.3IONS-SCNC: 12 MEQ/L (ref 8–16)
BARBITURATE QUANTITATIVE URINE: NEGATIVE
BASOPHILS # BLD: 0.4 %
BASOPHILS ABSOLUTE: 0 THOU/MM3 (ref 0–0.1)
BENZODIAZEPINE QUANTITATIVE URINE: NEGATIVE
BUN BLDV-MCNC: 14 MG/DL (ref 7–22)
CALCIUM SERPL-MCNC: 8.7 MG/DL (ref 8.5–10.5)
CANNABINOID QUANTITATIVE URINE: NEGATIVE
CHLORIDE BLD-SCNC: 103 MEQ/L (ref 98–111)
CO2: 25 MEQ/L (ref 23–33)
COCAINE METABOLITE QUANTITATIVE URINE: POSITIVE
CREAT SERPL-MCNC: 1 MG/DL (ref 0.4–1.2)
EOSINOPHIL # BLD: 5.1 %
EOSINOPHILS ABSOLUTE: 0.2 THOU/MM3 (ref 0–0.4)
ERYTHROCYTE [DISTWIDTH] IN BLOOD BY AUTOMATED COUNT: 12.9 % (ref 11.5–14.5)
ERYTHROCYTE [DISTWIDTH] IN BLOOD BY AUTOMATED COUNT: 46.5 FL (ref 35–45)
ETHYL ALCOHOL, SERUM: 0.06 %
GFR SERPL CREATININE-BSD FRML MDRD: 82 ML/MIN/1.73M2
GLUCOSE BLD-MCNC: 88 MG/DL (ref 70–108)
HCT VFR BLD CALC: 43.8 % (ref 42–52)
HEMOGLOBIN: 14.5 GM/DL (ref 14–18)
IMMATURE GRANS (ABS): 0.01 THOU/MM3 (ref 0–0.07)
IMMATURE GRANULOCYTES: 0.2 %
LYMPHOCYTES # BLD: 30.2 %
LYMPHOCYTES ABSOLUTE: 1.4 THOU/MM3 (ref 1–4.8)
MCH RBC QN AUTO: 32.2 PG (ref 26–33)
MCHC RBC AUTO-ENTMCNC: 33.1 GM/DL (ref 32.2–35.5)
MCV RBC AUTO: 97.3 FL (ref 80–94)
MONOCYTES # BLD: 13.7 %
MONOCYTES ABSOLUTE: 0.6 THOU/MM3 (ref 0.4–1.3)
NUCLEATED RED BLOOD CELLS: 0 /100 WBC
OPIATES, URINE: POSITIVE
OSMOLALITY CALCULATION: 279.3 MOSMOL/KG (ref 275–300)
OXYCODONE: NEGATIVE
PHENCYCLIDINE QUANTITATIVE URINE: NEGATIVE
PLATELET # BLD: 299 THOU/MM3 (ref 130–400)
PMV BLD AUTO: 8.7 FL (ref 9.4–12.4)
POTASSIUM REFLEX MAGNESIUM: 3.7 MEQ/L (ref 3.5–5.2)
RBC # BLD: 4.5 MILL/MM3 (ref 4.7–6.1)
SALICYLATE, SERUM: < 0.3 MG/DL (ref 2–10)
SEG NEUTROPHILS: 50.4 %
SEGMENTED NEUTROPHILS ABSOLUTE COUNT: 2.3 THOU/MM3 (ref 1.8–7.7)
SODIUM BLD-SCNC: 140 MEQ/L (ref 135–145)
TROPONIN T: < 0.01 NG/ML
WBC # BLD: 4.5 THOU/MM3 (ref 4.8–10.8)

## 2021-05-08 PROCEDURE — 80307 DRUG TEST PRSMV CHEM ANLYZR: CPT

## 2021-05-08 PROCEDURE — 80143 DRUG ASSAY ACETAMINOPHEN: CPT

## 2021-05-08 PROCEDURE — 85025 COMPLETE CBC W/AUTO DIFF WBC: CPT

## 2021-05-08 PROCEDURE — 36415 COLL VENOUS BLD VENIPUNCTURE: CPT

## 2021-05-08 PROCEDURE — 84484 ASSAY OF TROPONIN QUANT: CPT

## 2021-05-08 PROCEDURE — 71046 X-RAY EXAM CHEST 2 VIEWS: CPT

## 2021-05-08 PROCEDURE — 99284 EMERGENCY DEPT VISIT MOD MDM: CPT

## 2021-05-08 PROCEDURE — 82077 ASSAY SPEC XCP UR&BREATH IA: CPT

## 2021-05-08 PROCEDURE — 93005 ELECTROCARDIOGRAM TRACING: CPT | Performed by: EMERGENCY MEDICINE

## 2021-05-08 PROCEDURE — 80179 DRUG ASSAY SALICYLATE: CPT

## 2021-05-08 PROCEDURE — 80048 BASIC METABOLIC PNL TOTAL CA: CPT

## 2021-05-08 ASSESSMENT — ENCOUNTER SYMPTOMS
CHEST TIGHTNESS: 0
VOICE CHANGE: 0
COUGH: 0
VOMITING: 0
TROUBLE SWALLOWING: 0
NAUSEA: 0
SHORTNESS OF BREATH: 0
DIARRHEA: 0
BACK PAIN: 0
BLOOD IN STOOL: 0
ABDOMINAL PAIN: 0

## 2021-05-08 NOTE — ED NOTES
Bedside shift report given to this RN at this time by Eric Bernardo RN. Patient is up in bed and updated on plan of care at this time. Patient is alert and oriented x4 and respirations are regular and unlabored. Call light within reach       Amaris Harley RN  05/08/21 2848

## 2021-05-08 NOTE — ED PROVIDER NOTES
Comment: 6 24 ounces    Drug use: Yes     Comment: Percocet    Sexual activity: Not on file   Lifestyle    Physical activity     Days per week: Not on file     Minutes per session: Not on file    Stress: Not on file   Relationships    Social connections     Talks on phone: Not on file     Gets together: Not on file     Attends Hinduism service: Not on file     Active member of club or organization: Not on file     Attends meetings of clubs or organizations: Not on file     Relationship status: Not on file    Intimate partner violence     Fear of current or ex partner: Not on file     Emotionally abused: Not on file     Physically abused: Not on file     Forced sexual activity: Not on file   Other Topics Concern    Not on file   Social History Narrative    Not on file       REVIEW OF SYSTEMS     Review of Systems   Constitutional: Negative for chills, diaphoresis and fever. HENT: Negative for trouble swallowing and voice change. Eyes: Negative for visual disturbance. Respiratory: Negative for cough, chest tightness and shortness of breath. Cardiovascular: Negative for chest pain and leg swelling. Gastrointestinal: Negative for abdominal pain, blood in stool, diarrhea, nausea and vomiting. Genitourinary: Negative for dysuria, frequency and hematuria. Musculoskeletal: Negative for back pain and neck pain. Skin: Negative for rash and wound. Neurological: Negative for speech difficulty, weakness, numbness and headaches. Psychiatric/Behavioral: Negative for confusion. Except as noted above the remainder of the review of systems was reviewed and is. PHYSICAL EXAM    (up to 7 for level 4, 8 or more for level 5)     ED Triage Vitals [05/08/21 1721]   BP Temp Temp Source Pulse Resp SpO2 Height Weight   134/86 98.3 °F (36.8 °C) Oral 82 13 98 % 5' 10\" (1.778 m) 150 lb (68 kg)       Physical Exam  Vitals signs and nursing note reviewed.    Constitutional:       General: He is not in acute distress. Appearance: He is well-developed. He is not ill-appearing, toxic-appearing or diaphoretic. Comments: Awake and alert   HENT:      Head: Normocephalic and atraumatic. Eyes:      General: No scleral icterus. Conjunctiva/sclera: Conjunctivae normal.      Right eye: Right conjunctiva is not injected. Left eye: Left conjunctiva is not injected. Pupils: Pupils are equal, round, and reactive to light. Neck:      Musculoskeletal: Normal range of motion and neck supple. Thyroid: No thyromegaly. Trachea: No tracheal deviation. Cardiovascular:      Rate and Rhythm: Normal rate and regular rhythm. Heart sounds: Normal heart sounds. No murmur. No friction rub. No gallop. Pulmonary:      Effort: Pulmonary effort is normal. No respiratory distress. Breath sounds: Normal breath sounds. No stridor. No wheezing or rales. Abdominal:      General: Bowel sounds are normal. There is no distension. Palpations: Abdomen is soft. There is no mass. Tenderness: There is no abdominal tenderness. There is no guarding or rebound. Comments: Negative Grimes's sign  Nontender McBurney's Point  Negative Rovsig's sign  No bruising or echymosis of abdomen   Musculoskeletal:         General: No tenderness. Comments: Negative Ta's Sign bilaterally   Lymphadenopathy:      Cervical: No cervical adenopathy. Skin:     General: Skin is warm and dry. Coloration: Skin is not pale. Findings: No erythema or rash. Neurological:      Mental Status: He is alert and oriented to person, place, and time. Cranial Nerves: No cranial nerve deficit. Motor: No abnormal muscle tone. Coordination: Coordination normal.      Comments: No nystagmus   Psychiatric:         Behavior: Behavior normal.         Thought Content:  Thought content normal.         DIAGNOSTIC RESULTS     EKG:(none if blank)  All E[facility]'s are [facility] who either signs or Co-signs this chart in the absence of a cardiologist.    EKG shows no ischemia    RADIOLOGY: (none if blank)   Interpretation per the Radiologistbelow, if available at the time of this note:    XR CHEST (2 VW)   Final Result   Normal chest. No acute findings. **This report has been created using voice recognition software. It may contain minor errors which are inherent in voice recognition technology. **      Final report electronically signed by Dr. Bria Cota on 5/8/2021 6:05 PM          LABS:  Labs Reviewed   CBC WITH AUTO DIFFERENTIAL - Abnormal; Notable for the following components:       Result Value    WBC 4.5 (*)     RBC 4.50 (*)     MCV 97.3 (*)     RDW-SD 46.5 (*)     MPV 8.7 (*)     All other components within normal limits   SALICYLATE LEVEL - Abnormal; Notable for the following components:    Salicylate, Serum < 0.3 (*)     All other components within normal limits   GLOMERULAR FILTRATION RATE, ESTIMATED - Abnormal; Notable for the following components:    Est, Glom Filt Rate 82 (*)     All other components within normal limits   BASIC METABOLIC PANEL W/ REFLEX TO MG FOR LOW K   TROPONIN   URINE DRUG SCREEN   ETHANOL   ACETAMINOPHEN LEVEL   ANION GAP   OSMOLALITY       All other labs were within normal range or not returned as of this dictation. Please note, any cultures that may have been sent were not resulted at the time of this patient visit. EMERGENCY DEPARTMENT COURSE andMedical Decision Making:     MDM/  ED Course as of May 09 1656   Sat May 08, 2021   1823 XR CHEST (2 VW) [AB]   1900 Patient is moderate emergency part of several hours. During that time, he is not experienced any apnea or any significant events. At this point, I feel he is stable for discharge. I had a lengthy conversation with him about cessation of drug use as well as EtOH use.   I have counseled him about safe use practices, will write her prescription for Narcan and is advised if he is going to continue using, to use with others present with him. We will also refer him to a substance abuse clinic.    [AB]   1901 Of note his EtOH level. Clinically, the patient is not intoxicated and I do not feel that he is at any significantly increased risk to himself or others in his current present clinical state    [AB]      ED Course User Index  [AB] Bishop Skiff, DO         Strict returnprecautions and follow up instructions were discussed with the patient with which the patient agrees      The patient was evaluated during the COVID-19 pandemic. The patient was screened today during their presentation for commonly recognized symptoms and signs of COVID-19 infection. Their evaluation, treatment and testing was consistent with current guidelines for patients who present with complaints or symptoms that may be related to COVID-19. ED Medications administered this visit:  Medications - No data to display      Procedures: (None if blank)       CLINICAL       1. Opiate overdose, undetermined intent, initial encounter ([facility] [address]    2.  Alcohol use disorder, mild, abuse          DISPOSITION/PLAN   DISPOSITION Decision To Discharge 05/08/2021 07:01:32 PM      PATIENT REFERRED TO:  Kamla Huerta MD  [address] [place] [address] [address]  [phone]    Go in 3 days        DISCHARGE MEDICATIONS:  Discharge Medication List as of 5/8/2021  7:03 PM      START taking these medications    Details   Naloxone HCl (NALOXONE OPIATE OVERDOSE KIT) 1 each by Nasal route once for 1 dose, Disp-1 kit, R-0Print                    (Please note that portions of this note were completed with a voice recognition program.  Efforts were made to edit the dictations but occasionallywords are mis-transcribed.)      Delories Hands, DO,FACEP (electronically signed)  Attending Physician, Emergency [address], DO  05/09/21 1657

## 2021-05-08 NOTE — ED TRIAGE NOTES
Patient to ED via EMS with complaints of a drug overdose. Patient states he thought he took 2 percocet-30's. EMS arrived on scene and states he was acting altered and respirations began to slow. Patient given 2mg narcan and patient became alert. Patient arrives to room 17 alert and oriented x4, and very diaphoretic. Patient is very cooperative and willing to answer questions. Patient denies other drug use. States he vapes and drank a smirnoff ice earlier today.

## 2021-05-08 NOTE — ED NOTES
Patient advised of need for urine sample. States he cannot urinate at this time but will attempt in the next half hour. Patient otherwise resting comfortably, respirations unlabored. Denies pain.       Patricia Thomas RN  05/08/21 8887

## 2021-05-09 LAB
EKG ATRIAL RATE: 79 BPM
EKG P AXIS: 74 DEGREES
EKG P-R INTERVAL: 134 MS
EKG Q-T INTERVAL: 402 MS
EKG QRS DURATION: 96 MS
EKG QTC CALCULATION (BAZETT): 460 MS
EKG R AXIS: 74 DEGREES
EKG T AXIS: 63 DEGREES
EKG VENTRICULAR RATE: 79 BPM

## 2021-05-09 PROCEDURE — 93010 ELECTROCARDIOGRAM REPORT: CPT | Performed by: INTERNAL MEDICINE

## 2021-06-19 ENCOUNTER — APPOINTMENT (OUTPATIENT)
Dept: GENERAL RADIOLOGY | Age: 41
End: 2021-06-19
Payer: MEDICAID

## 2021-06-19 ENCOUNTER — HOSPITAL ENCOUNTER (EMERGENCY)
Age: 41
Discharge: HOME | End: 2021-06-19
Attending: FAMILY MEDICINE
Payer: MEDICAID

## 2021-06-19 VITALS
WEIGHT: 160 LBS | OXYGEN SATURATION: 95 % | SYSTOLIC BLOOD PRESSURE: 101 MMHG | BODY MASS INDEX: 22.9 KG/M2 | DIASTOLIC BLOOD PRESSURE: 67 MMHG | HEIGHT: 70 IN | TEMPERATURE: 100.6 F | RESPIRATION RATE: 16 BRPM | HEART RATE: 89 BPM

## 2021-06-19 DIAGNOSIS — F19.10 POLYSUBSTANCE ABUSE (HCC): Primary | ICD-10-CM

## 2021-06-19 LAB
ALBUMIN SERPL-MCNC: 4.4 G/DL (ref 3.5–5.1)
ALP BLD-CCNC: 108 U/L (ref 38–126)
ALT SERPL-CCNC: 25 U/L (ref 11–66)
ANION GAP SERPL CALCULATED.3IONS-SCNC: 34 MEQ/L (ref 8–16)
AST SERPL-CCNC: 32 U/L (ref 5–40)
BASOPHILS # BLD: 0.3 %
BASOPHILS ABSOLUTE: 0 THOU/MM3 (ref 0–0.1)
BILIRUB SERPL-MCNC: 0.4 MG/DL (ref 0.3–1.2)
BUN BLDV-MCNC: 8 MG/DL (ref 7–22)
CALCIUM SERPL-MCNC: 9.6 MG/DL (ref 8.5–10.5)
CHLORIDE BLD-SCNC: 92 MEQ/L (ref 98–111)
CO2: 13 MEQ/L (ref 23–33)
CREAT SERPL-MCNC: 1.5 MG/DL (ref 0.4–1.2)
EKG ATRIAL RATE: 95 BPM
EKG P AXIS: 76 DEGREES
EKG P-R INTERVAL: 132 MS
EKG Q-T INTERVAL: 382 MS
EKG QRS DURATION: 94 MS
EKG QTC CALCULATION (BAZETT): 480 MS
EKG R AXIS: 79 DEGREES
EKG T AXIS: 59 DEGREES
EKG VENTRICULAR RATE: 95 BPM
EOSINOPHIL # BLD: 0.3 %
EOSINOPHILS ABSOLUTE: 0 THOU/MM3 (ref 0–0.4)
ERYTHROCYTE [DISTWIDTH] IN BLOOD BY AUTOMATED COUNT: 12.6 % (ref 11.5–14.5)
ERYTHROCYTE [DISTWIDTH] IN BLOOD BY AUTOMATED COUNT: 48.6 FL (ref 35–45)
GFR SERPL CREATININE-BSD FRML MDRD: 52 ML/MIN/1.73M2
GLUCOSE BLD-MCNC: 300 MG/DL (ref 70–108)
HCT VFR BLD CALC: 49.6 % (ref 42–52)
HEMOGLOBIN: 15.2 GM/DL (ref 14–18)
IMMATURE GRANS (ABS): 0.07 THOU/MM3 (ref 0–0.07)
IMMATURE GRANULOCYTES: 0.9 %
LACTIC ACID, SEPSIS: 5.3 MMOL/L (ref 0.5–1.9)
LYMPHOCYTES # BLD: 35.6 %
LYMPHOCYTES ABSOLUTE: 2.7 THOU/MM3 (ref 1–4.8)
MAGNESIUM: 2.8 MG/DL (ref 1.6–2.4)
MCH RBC QN AUTO: 31.7 PG (ref 26–33)
MCHC RBC AUTO-ENTMCNC: 30.6 GM/DL (ref 32.2–35.5)
MCV RBC AUTO: 103.3 FL (ref 80–94)
MONOCYTES # BLD: 9.1 %
MONOCYTES ABSOLUTE: 0.7 THOU/MM3 (ref 0.4–1.3)
NUCLEATED RED BLOOD CELLS: 0 /100 WBC
OSMOLALITY CALCULATION: 287.1 MOSMOL/KG (ref 275–300)
PLATELET # BLD: 483 THOU/MM3 (ref 130–400)
PMV BLD AUTO: 9.1 FL (ref 9.4–12.4)
POTASSIUM REFLEX MAGNESIUM: 3.4 MEQ/L (ref 3.5–5.2)
POTASSIUM SERPL-SCNC: 3.4 MEQ/L (ref 3.5–5.2)
RBC # BLD: 4.8 MILL/MM3 (ref 4.7–6.1)
SEG NEUTROPHILS: 53.8 %
SEGMENTED NEUTROPHILS ABSOLUTE COUNT: 4 THOU/MM3 (ref 1.8–7.7)
SODIUM BLD-SCNC: 139 MEQ/L (ref 135–145)
TOTAL CK: 85 U/L (ref 55–170)
TOTAL PROTEIN: 8.1 G/DL (ref 6.1–8)
TSH SERPL DL<=0.05 MIU/L-ACNC: 3.55 UIU/ML (ref 0.4–4.2)
WBC # BLD: 7.5 THOU/MM3 (ref 4.8–10.8)

## 2021-06-19 PROCEDURE — 36415 COLL VENOUS BLD VENIPUNCTURE: CPT

## 2021-06-19 PROCEDURE — 83735 ASSAY OF MAGNESIUM: CPT

## 2021-06-19 PROCEDURE — 82550 ASSAY OF CK (CPK): CPT

## 2021-06-19 PROCEDURE — 85025 COMPLETE CBC W/AUTO DIFF WBC: CPT

## 2021-06-19 PROCEDURE — 93005 ELECTROCARDIOGRAM TRACING: CPT | Performed by: FAMILY MEDICINE

## 2021-06-19 PROCEDURE — 99285 EMERGENCY DEPT VISIT HI MDM: CPT

## 2021-06-19 PROCEDURE — 96375 TX/PRO/DX INJ NEW DRUG ADDON: CPT

## 2021-06-19 PROCEDURE — 96374 THER/PROPH/DIAG INJ IV PUSH: CPT

## 2021-06-19 PROCEDURE — 71045 X-RAY EXAM CHEST 1 VIEW: CPT

## 2021-06-19 PROCEDURE — 83605 ASSAY OF LACTIC ACID: CPT

## 2021-06-19 PROCEDURE — 6360000002 HC RX W HCPCS

## 2021-06-19 PROCEDURE — 84443 ASSAY THYROID STIM HORMONE: CPT

## 2021-06-19 PROCEDURE — 93010 ELECTROCARDIOGRAM REPORT: CPT | Performed by: INTERNAL MEDICINE

## 2021-06-19 PROCEDURE — 80053 COMPREHEN METABOLIC PANEL: CPT

## 2021-06-19 PROCEDURE — 2500000003 HC RX 250 WO HCPCS

## 2021-06-19 RX ORDER — HALOPERIDOL 5 MG/ML
10 INJECTION INTRAMUSCULAR ONCE
Status: DISCONTINUED | OUTPATIENT
Start: 2021-06-19 | End: 2021-06-20 | Stop reason: HOSPADM

## 2021-06-19 RX ORDER — LORAZEPAM 2 MG/ML
1 INJECTION INTRAMUSCULAR ONCE
Status: COMPLETED | OUTPATIENT
Start: 2021-06-19 | End: 2021-06-19

## 2021-06-19 RX ORDER — HALOPERIDOL 5 MG/ML
INJECTION INTRAMUSCULAR
Status: DISCONTINUED
Start: 2021-06-19 | End: 2021-06-20 | Stop reason: HOSPADM

## 2021-06-19 RX ORDER — METOPROLOL TARTRATE 5 MG/5ML
5 INJECTION INTRAVENOUS ONCE
Status: COMPLETED | OUTPATIENT
Start: 2021-06-19 | End: 2021-06-19

## 2021-06-19 RX ORDER — LORAZEPAM 2 MG/ML
INJECTION INTRAMUSCULAR
Status: COMPLETED
Start: 2021-06-19 | End: 2021-06-19

## 2021-06-19 RX ORDER — METOPROLOL TARTRATE 5 MG/5ML
INJECTION INTRAVENOUS
Status: COMPLETED
Start: 2021-06-19 | End: 2021-06-19

## 2021-06-19 RX ADMIN — METOPROLOL TARTRATE 5 MG: 5 INJECTION INTRAVENOUS at 20:40

## 2021-06-19 RX ADMIN — LORAZEPAM 1 MG: 2 INJECTION INTRAMUSCULAR; INTRAVENOUS at 20:40

## 2021-06-19 RX ADMIN — LORAZEPAM 1 MG: 2 INJECTION INTRAMUSCULAR at 20:40

## 2021-06-20 NOTE — ED NOTES
Bed: 024A  Expected date: 6/19/21  Expected time: 8:26 PM  Means of arrival: [facility]  Comments:     Theodora Vegas RN  06/19/21 2030

## 2021-06-20 NOTE — ED NOTES
Pt to ED via [facility] for potential overdose. Pt states he thought he took a Percocet, states consuming something unlabeled and does not know what it was. Pt diaphoretic at this time and speaks to self with words not being understood by this nurse. Pt able to speak coherently when asked questions and follows commands. Pt A&O X3 at this time, alert to self, place, and situation. Pt breaths easy and unlabored, no distress noted at this time. Dr. Allyssa Mcbride at bedside for assessment.        [facility]  06/19/21 204

## 2021-06-20 NOTE — ED NOTES
Pt attempts to urinate at this time after this nurse requests multiple times. Pt states unable to urinate at this time. Pt resting in bed with call light in reach, breaths easy and unlabored with no distress noted.        Otto, [address] [facility]  06/19/21 0162

## 2021-06-20 NOTE — ED NOTES
Pt resting in bed with sitter at bedside. Pt states no further needs at this time. No distress noted as pt breaths easy and unlabored.        Otto, [address] [facility]  06/19/21 6446

## 2021-06-20 NOTE — ED NOTES
Dr. Ashley Romero at bedside for assessment. Pt resting in bed with call light in reach, calm and cooperative. Pt sweating decreased at this time, pt pulse 95 bpm.  Pt states no further needs. Pt given urinal for urine collection, call light in reach. Sitter at bedside. No distress noted at this time.        Otto, [facility]  06/19/21 3247

## 2021-06-20 NOTE — ED PROVIDER NOTES
[address] [address] EMERGENCY DEPT  EMERGENCY DEPARTMENT ENCOUNTER      Pt Name: David Cortés  MRN: [id number]  [place] 1980  Date of evaluation: 6/19/2021  Provider: Lacey Burkitt, MD    [address] [place] [address]       Chief Complaint   Patient presents with    Drug Overdose     Unknown drug          HISTORY OF PRESENT ILLNESS   (Location/Symptom, Timing/Onset, Context/Setting, Quality, Duration, Modifying Factors, Severity)  Note limiting factors. David Cortés is a 36 y.o. male who presents to the emergency department complaining of chronic back pain. The patient states that since he has back pain he decided to do a bunch of drugs today and he refuses to tell us what it was he told us to just look in his urine and see what he did. The patient is brought in by host of a different police officers because he had been agitated and violent on scene, the patient currently now is diaphoretic upon arrival.  He is shaking in all extremities well wide awake and images is tense tremulous and agitated. The patient has a history of doing this before his drug of choice is usually anything methamphetamine and heroin together, it would make sense that he is doing both now. The patient has a history of multiple substances in the past.     HPI    Nursing Notes were reviewed. REVIEW OF SYSTEMS    (2-9 systems for level 4, 10 or more for level 5)     Review of Systems   Reason unable to perform ROS: patient uncooperative. Except as noted above the remainder of the review of systems was reviewed and negative. PAST MEDICAL HISTORY   History reviewed. No pertinent past medical history. SURGICAL HISTORY     History reviewed. No pertinent surgical history. CURRENT MEDICATIONS       Previous Medications    No medications on file       ALLERGIES     Patient has no known allergies. FAMILY HISTORY     History reviewed. No pertinent family history.        SOCIAL HISTORY       Social History     Socioeconomic History    atraumatic. Nose: Nose normal.      Mouth/Throat:      Mouth: Mucous membranes are dry. Pharynx: Oropharynx is clear. Eyes:      Extraocular Movements: Extraocular movements intact. Conjunctiva/sclera: Conjunctivae normal.      Pupils: Pupils are equal, round, and reactive to light. Cardiovascular:      Rate and Rhythm: Normal rate and regular rhythm. Pulmonary:      Effort: Pulmonary effort is normal.      Breath sounds: Normal breath sounds. Abdominal:      General: Abdomen is flat. Bowel sounds are normal.      Palpations: Abdomen is soft. Musculoskeletal:      Cervical back: Normal range of motion and neck supple. Skin:     General: Skin is warm. Capillary Refill: Capillary refill takes less than 2 seconds. Neurological:      Mental Status: He is alert. DIAGNOSTIC RESULTS     EKG: All EKG's are interpreted by the Emergency Department Physician who either signs or Co-signs this chart in the absence of a cardiologist.        RADIOLOGY:   Non-plain film images such as CT, Ultrasound and MRI are read by the radiologist. Plain radiographic images are visualized and preliminarily interpreted by the emergency physician with the below findings:        Interpretation per the Radiologist below, if available at the time of this note:    XR CHEST PORTABLE   Final Result   No acute findings.       This document has been electronically signed by: Lucianne Fleischer, MD on    06/19/2021 09:26 PM            ED BEDSIDE ULTRASOUND:   Performed by ED Physician - none    LABS:  Labs Reviewed   CBC WITH AUTO DIFFERENTIAL - Abnormal; Notable for the following components:       Result Value    .3 (*)     MCHC 30.6 (*)     RDW-SD 48.6 (*)     Platelets 490 (*)     MPV 9.1 (*)     All other components within normal limits   COMPREHENSIVE METABOLIC PANEL W/ REFLEX TO MG FOR LOW K - Abnormal; Notable for the following components:    Glucose 300 (*)     CREATININE 1.5 (*)     Potassium reflex Magnesium 3.4 (*)     Chloride 92 (*)     CO2 13 (*)     Total Protein 8.1 (*)     All other components within normal limits   LACTATE, SEPSIS - Abnormal; Notable for the following components:    Lactic Acid, Sepsis 5.3 (*)     All other components within normal limits   BASIC METABOLIC PANEL - Abnormal; Notable for the following components:    Potassium 3.4 (*)     All other components within normal limits   ANION GAP - Abnormal; Notable for the following components:    Anion Gap 34.0 (*)     All other components within normal limits   GLOMERULAR FILTRATION RATE, ESTIMATED - Abnormal; Notable for the following components:    Est, Glom Filt Rate 52 (*)     All other components within normal limits   MAGNESIUM - Abnormal; Notable for the following components:    Magnesium 2.8 (*)     All other components within normal limits   TSH WITHOUT REFLEX   OSMOLALITY   URINE RT REFLEX TO CULTURE   LACTATE, SEPSIS   CK ISOENZYMES   URINE DRUG SCREEN       All other labs were within normal range or not returned as of this dictation. EMERGENCY DEPARTMENT COURSE and DIFFERENTIAL DIAGNOSIS/MDM:   Vitals:    Vitals:    06/19/21 2031 06/19/21 2043 06/19/21 2144   BP: (!) 119/92  101/67   Pulse: 164 99 89   Resp: 16 16 16   Temp: 100.6 °F (38.1 °C)     TempSrc: Oral     SpO2: 96% 95% 95%   Weight: 160 lb (72.6 kg)     Height: 5' 10\" (1.778 m)             MDM      REASSESSMENT      And discussed the patient is overall clinical condition and bilateral decision-making, patient is has a chronic polysubstance abuse disorder but refuses to get help refuses to admit to it being a problem to begin with and for the most part is uncooperative with the evaluation however he does calm down and gently tell me that he is essentially nonsuicidal and non-homicidal and he is not planning on hurting himself he just happened to get himself into a overdose situation this afternoon but is feeling much better now.     CRITICAL CARE TIME   Total Critical Care time was 0 minutes, excluding separately reportable procedures. There was a high probability of clinically significant/life threatening deterioration in the patient's condition which required my urgent intervention. *    CONSULTS:  None    PROCEDURES:  Unless otherwise noted below, none     Procedures        FINAL IMPRESSION      1. Polysubstance abuse [facility])          DISPOSITION/PLAN   DISPOSITION Decision To Discharge 06/19/2021 10:21:32 PM      PATIENT REFERRED TO:  [facility]    Schedule an appointment as soon as possible for a visit   With primary care doctor to follow up from ER      DISCHARGE MEDICATIONS:  New Prescriptions    No medications on file     Controlled Substances Monitoring:     No flowsheet data found.     (Please note that portions of this note were completed with a voice recognition program.  Efforts were made to edit the dictations but occasionally words are mis-transcribed.)    Vanessa Haynes MD (electronically signed)  Attending Emergency Physician          Rosa Dorsey MD  06/19/21 2364

## 2021-08-19 ENCOUNTER — HOSPITAL ENCOUNTER (EMERGENCY)
Age: 41
Discharge: HOME | End: 2021-08-20
Attending: EMERGENCY MEDICINE
Payer: MEDICAID

## 2021-08-19 DIAGNOSIS — R41.82 ALTERED MENTAL STATUS, UNSPECIFIED ALTERED MENTAL STATUS TYPE: Primary | ICD-10-CM

## 2021-08-19 DIAGNOSIS — F12.10 MARIJUANA ABUSE: ICD-10-CM

## 2021-08-19 DIAGNOSIS — M62.82 EXERTIONAL RHABDOMYOLYSIS: ICD-10-CM

## 2021-08-19 LAB
ALBUMIN SERPL-MCNC: 4.8 G/DL (ref 3.5–5.1)
ALP BLD-CCNC: 103 U/L (ref 38–126)
ALT SERPL-CCNC: 45 U/L (ref 11–66)
ANION GAP SERPL CALCULATED.3IONS-SCNC: 23 MEQ/L (ref 8–16)
AST SERPL-CCNC: 51 U/L (ref 5–40)
BASOPHILS # BLD: 0.3 %
BASOPHILS ABSOLUTE: 0 THOU/MM3 (ref 0–0.1)
BILIRUB SERPL-MCNC: 0.5 MG/DL (ref 0.3–1.2)
BUN BLDV-MCNC: 14 MG/DL (ref 7–22)
CALCIUM SERPL-MCNC: 10 MG/DL (ref 8.5–10.5)
CHLORIDE BLD-SCNC: 99 MEQ/L (ref 98–111)
CO2: 21 MEQ/L (ref 23–33)
CREAT SERPL-MCNC: 1.4 MG/DL (ref 0.4–1.2)
EOSINOPHIL # BLD: 1.8 %
EOSINOPHILS ABSOLUTE: 0.2 THOU/MM3 (ref 0–0.4)
ERYTHROCYTE [DISTWIDTH] IN BLOOD BY AUTOMATED COUNT: 13.4 % (ref 11.5–14.5)
ERYTHROCYTE [DISTWIDTH] IN BLOOD BY AUTOMATED COUNT: 49.6 FL (ref 35–45)
ETHYL ALCOHOL, SERUM: < 0.01 %
GFR SERPL CREATININE-BSD FRML MDRD: 56 ML/MIN/1.73M2
GLUCOSE BLD-MCNC: 236 MG/DL (ref 70–108)
HCT VFR BLD CALC: 45 % (ref 42–52)
HEMOGLOBIN: 14.4 GM/DL (ref 14–18)
IMMATURE GRANS (ABS): 0.06 THOU/MM3 (ref 0–0.07)
IMMATURE GRANULOCYTES: 0.7 %
LIPASE: 27.9 U/L (ref 5.6–51.3)
LYMPHOCYTES # BLD: 45.9 %
LYMPHOCYTES ABSOLUTE: 4.2 THOU/MM3 (ref 1–4.8)
MCH RBC QN AUTO: 32.1 PG (ref 26–33)
MCHC RBC AUTO-ENTMCNC: 32 GM/DL (ref 32.2–35.5)
MCV RBC AUTO: 100.2 FL (ref 80–94)
MONOCYTES # BLD: 6.9 %
MONOCYTES ABSOLUTE: 0.6 THOU/MM3 (ref 0.4–1.3)
NUCLEATED RED BLOOD CELLS: 0 /100 WBC
OSMOLALITY CALCULATION: 293.1 MOSMOL/KG (ref 275–300)
PLATELET # BLD: 389 THOU/MM3 (ref 130–400)
PMV BLD AUTO: 9.2 FL (ref 9.4–12.4)
POTASSIUM REFLEX MAGNESIUM: 4.8 MEQ/L (ref 3.5–5.2)
RBC # BLD: 4.49 MILL/MM3 (ref 4.7–6.1)
SALICYLATE, SERUM: < 0.3 MG/DL (ref 2–10)
SEG NEUTROPHILS: 44.4 %
SEGMENTED NEUTROPHILS ABSOLUTE COUNT: 4 THOU/MM3 (ref 1.8–7.7)
SODIUM BLD-SCNC: 143 MEQ/L (ref 135–145)
TOTAL PROTEIN: 7.5 G/DL (ref 6.1–8)
WBC # BLD: 9.1 THOU/MM3 (ref 4.8–10.8)

## 2021-08-19 PROCEDURE — 96360 HYDRATION IV INFUSION INIT: CPT

## 2021-08-19 PROCEDURE — 80053 COMPREHEN METABOLIC PANEL: CPT

## 2021-08-19 PROCEDURE — 80179 DRUG ASSAY SALICYLATE: CPT

## 2021-08-19 PROCEDURE — 83690 ASSAY OF LIPASE: CPT

## 2021-08-19 PROCEDURE — 6360000002 HC RX W HCPCS

## 2021-08-19 PROCEDURE — 96361 HYDRATE IV INFUSION ADD-ON: CPT

## 2021-08-19 PROCEDURE — 82077 ASSAY SPEC XCP UR&BREATH IA: CPT

## 2021-08-19 PROCEDURE — 36415 COLL VENOUS BLD VENIPUNCTURE: CPT

## 2021-08-19 PROCEDURE — 82550 ASSAY OF CK (CPK): CPT

## 2021-08-19 PROCEDURE — 96374 THER/PROPH/DIAG INJ IV PUSH: CPT

## 2021-08-19 PROCEDURE — 85025 COMPLETE CBC W/AUTO DIFF WBC: CPT

## 2021-08-19 PROCEDURE — 99285 EMERGENCY DEPT VISIT HI MDM: CPT

## 2021-08-19 PROCEDURE — 96376 TX/PRO/DX INJ SAME DRUG ADON: CPT

## 2021-08-19 RX ORDER — LORAZEPAM 2 MG/ML
INJECTION INTRAMUSCULAR
Status: COMPLETED
Start: 2021-08-19 | End: 2021-08-19

## 2021-08-19 RX ORDER — OXYCODONE HYDROCHLORIDE AND ACETAMINOPHEN 5; 325 MG/1; MG/1
1 TABLET ORAL ONCE
Status: DISCONTINUED | OUTPATIENT
Start: 2021-08-19 | End: 2021-08-20 | Stop reason: HOSPADM

## 2021-08-19 RX ORDER — LORAZEPAM 2 MG/ML
2 INJECTION INTRAMUSCULAR ONCE
Status: COMPLETED | OUTPATIENT
Start: 2021-08-19 | End: 2021-08-19

## 2021-08-19 RX ADMIN — LORAZEPAM 2 MG: 2 INJECTION INTRAMUSCULAR at 21:52

## 2021-08-19 RX ADMIN — LORAZEPAM 2 MG: 2 INJECTION INTRAMUSCULAR; INTRAVENOUS at 21:52

## 2021-08-20 ENCOUNTER — HOSPITAL ENCOUNTER (EMERGENCY)
Age: 41
Discharge: HOME | End: 2021-08-21
Attending: EMERGENCY MEDICINE
Payer: MEDICAID

## 2021-08-20 ENCOUNTER — HOSPITAL ENCOUNTER (EMERGENCY)
Age: 41
Discharge: HOME | End: 2021-08-20
Payer: MEDICAID

## 2021-08-20 VITALS
SYSTOLIC BLOOD PRESSURE: 106 MMHG | TEMPERATURE: 99.3 F | HEART RATE: 72 BPM | RESPIRATION RATE: 19 BRPM | WEIGHT: 160 LBS | DIASTOLIC BLOOD PRESSURE: 69 MMHG | BODY MASS INDEX: 22.96 KG/M2 | OXYGEN SATURATION: 98 %

## 2021-08-20 VITALS
HEART RATE: 61 BPM | WEIGHT: 155 LBS | RESPIRATION RATE: 17 BRPM | SYSTOLIC BLOOD PRESSURE: 107 MMHG | TEMPERATURE: 98.7 F | HEIGHT: 70 IN | OXYGEN SATURATION: 97 % | BODY MASS INDEX: 22.19 KG/M2 | DIASTOLIC BLOOD PRESSURE: 69 MMHG

## 2021-08-20 DIAGNOSIS — F19.10 POLYSUBSTANCE ABUSE (HCC): Primary | ICD-10-CM

## 2021-08-20 DIAGNOSIS — F11.29 OPIOID DEPENDENCE WITH OPIOID-INDUCED DISORDER (HCC): Primary | ICD-10-CM

## 2021-08-20 LAB
ALBUMIN SERPL-MCNC: 4.1 G/DL (ref 3.5–5.1)
ALP BLD-CCNC: 76 U/L (ref 38–126)
ALT SERPL-CCNC: 49 U/L (ref 11–66)
AMPHETAMINE+METHAMPHETAMINE URINE SCREEN: NEGATIVE
ANION GAP SERPL CALCULATED.3IONS-SCNC: 9 MEQ/L (ref 8–16)
AST SERPL-CCNC: 78 U/L (ref 5–40)
BARBITURATE QUANTITATIVE URINE: NEGATIVE
BASOPHILS # BLD: 0.4 %
BASOPHILS ABSOLUTE: 0 THOU/MM3 (ref 0–0.1)
BENZODIAZEPINE QUANTITATIVE URINE: NEGATIVE
BILIRUB SERPL-MCNC: 0.9 MG/DL (ref 0.3–1.2)
BUN BLDV-MCNC: 16 MG/DL (ref 7–22)
CALCIUM SERPL-MCNC: 8.7 MG/DL (ref 8.5–10.5)
CANNABINOID QUANTITATIVE URINE: POSITIVE
CHLORIDE BLD-SCNC: 106 MEQ/L (ref 98–111)
CO2: 24 MEQ/L (ref 23–33)
COCAINE METABOLITE QUANTITATIVE URINE: NEGATIVE
CREAT SERPL-MCNC: 0.8 MG/DL (ref 0.4–1.2)
EOSINOPHIL # BLD: 2.4 %
EOSINOPHILS ABSOLUTE: 0.2 THOU/MM3 (ref 0–0.4)
ERYTHROCYTE [DISTWIDTH] IN BLOOD BY AUTOMATED COUNT: 13.5 % (ref 11.5–14.5)
ERYTHROCYTE [DISTWIDTH] IN BLOOD BY AUTOMATED COUNT: 50.1 FL (ref 35–45)
ETHYL ALCOHOL, SERUM: < 0.01 %
GFR SERPL CREATININE-BSD FRML MDRD: > 90 ML/MIN/1.73M2
GLUCOSE BLD-MCNC: 78 MG/DL (ref 70–108)
HCT VFR BLD CALC: 39.3 % (ref 42–52)
HEMOGLOBIN: 12.5 GM/DL (ref 14–18)
IMMATURE GRANS (ABS): 0.03 THOU/MM3 (ref 0–0.07)
IMMATURE GRANULOCYTES: 0.4 %
LACTIC ACID: 0.5 MMOL/L (ref 0.5–2)
LYMPHOCYTES # BLD: 15.5 %
LYMPHOCYTES ABSOLUTE: 1.2 THOU/MM3 (ref 1–4.8)
MAGNESIUM: 2.2 MG/DL (ref 1.6–2.4)
MCH RBC QN AUTO: 31.6 PG (ref 26–33)
MCHC RBC AUTO-ENTMCNC: 31.8 GM/DL (ref 32.2–35.5)
MCV RBC AUTO: 99.5 FL (ref 80–94)
MONOCYTES # BLD: 8.7 %
MONOCYTES ABSOLUTE: 0.7 THOU/MM3 (ref 0.4–1.3)
NUCLEATED RED BLOOD CELLS: 0 /100 WBC
OPIATES, URINE: POSITIVE
OSMOLALITY CALCULATION: 277.6 MOSMOL/KG (ref 275–300)
OXYCODONE: NEGATIVE
PHENCYCLIDINE QUANTITATIVE URINE: NEGATIVE
PHOSPHORUS: 3.8 MG/DL (ref 2.4–4.7)
PLATELET # BLD: 261 THOU/MM3 (ref 130–400)
PMV BLD AUTO: 9 FL (ref 9.4–12.4)
POTASSIUM SERPL-SCNC: 3.9 MEQ/L (ref 3.5–5.2)
RBC # BLD: 3.95 MILL/MM3 (ref 4.7–6.1)
SEG NEUTROPHILS: 72.6 %
SEGMENTED NEUTROPHILS ABSOLUTE COUNT: 5.5 THOU/MM3 (ref 1.8–7.7)
SODIUM BLD-SCNC: 139 MEQ/L (ref 135–145)
TOTAL CK: 1365 U/L (ref 55–170)
TOTAL CK: 1643 U/L (ref 55–170)
TOTAL CK: 248 U/L (ref 55–170)
TOTAL PROTEIN: 6.3 G/DL (ref 6.1–8)
URIC ACID: 8.4 MG/DL (ref 3.7–7)
WBC # BLD: 7.6 THOU/MM3 (ref 4.8–10.8)

## 2021-08-20 PROCEDURE — 99285 EMERGENCY DEPT VISIT HI MDM: CPT

## 2021-08-20 PROCEDURE — 82077 ASSAY SPEC XCP UR&BREATH IA: CPT

## 2021-08-20 PROCEDURE — 2580000003 HC RX 258: Performed by: EMERGENCY MEDICINE

## 2021-08-20 PROCEDURE — 84550 ASSAY OF BLOOD/URIC ACID: CPT

## 2021-08-20 PROCEDURE — 83605 ASSAY OF LACTIC ACID: CPT

## 2021-08-20 PROCEDURE — 93005 ELECTROCARDIOGRAM TRACING: CPT | Performed by: NURSE PRACTITIONER

## 2021-08-20 PROCEDURE — 6360000002 HC RX W HCPCS: Performed by: EMERGENCY MEDICINE

## 2021-08-20 PROCEDURE — 99284 EMERGENCY DEPT VISIT MOD MDM: CPT

## 2021-08-20 PROCEDURE — 80053 COMPREHEN METABOLIC PANEL: CPT

## 2021-08-20 PROCEDURE — 82550 ASSAY OF CK (CPK): CPT

## 2021-08-20 PROCEDURE — 96374 THER/PROPH/DIAG INJ IV PUSH: CPT

## 2021-08-20 PROCEDURE — 80307 DRUG TEST PRSMV CHEM ANLYZR: CPT

## 2021-08-20 PROCEDURE — 83735 ASSAY OF MAGNESIUM: CPT

## 2021-08-20 PROCEDURE — 36415 COLL VENOUS BLD VENIPUNCTURE: CPT

## 2021-08-20 PROCEDURE — 85025 COMPLETE CBC W/AUTO DIFF WBC: CPT

## 2021-08-20 PROCEDURE — 84100 ASSAY OF PHOSPHORUS: CPT

## 2021-08-20 PROCEDURE — 99282 EMERGENCY DEPT VISIT SF MDM: CPT

## 2021-08-20 RX ORDER — 0.9 % SODIUM CHLORIDE 0.9 %
1000 INTRAVENOUS SOLUTION INTRAVENOUS ONCE
Status: COMPLETED | OUTPATIENT
Start: 2021-08-20 | End: 2021-08-20

## 2021-08-20 RX ORDER — 0.9 % SODIUM CHLORIDE 0.9 %
1000 INTRAVENOUS SOLUTION INTRAVENOUS ONCE
Status: DISCONTINUED | OUTPATIENT
Start: 2021-08-20 | End: 2021-08-20 | Stop reason: HOSPADM

## 2021-08-20 RX ORDER — LORAZEPAM 2 MG/ML
2 INJECTION INTRAMUSCULAR EVERY 6 HOURS PRN
Status: DISCONTINUED | OUTPATIENT
Start: 2021-08-20 | End: 2021-08-20 | Stop reason: HOSPADM

## 2021-08-20 RX ORDER — 0.9 % SODIUM CHLORIDE 0.9 %
2000 INTRAVENOUS SOLUTION INTRAVENOUS ONCE
Status: DISCONTINUED | OUTPATIENT
Start: 2021-08-20 | End: 2021-08-20 | Stop reason: HOSPADM

## 2021-08-20 RX ADMIN — SODIUM CHLORIDE 1000 ML: 9 INJECTION, SOLUTION INTRAVENOUS at 07:59

## 2021-08-20 RX ADMIN — SODIUM CHLORIDE 1000 ML: 9 INJECTION, SOLUTION INTRAVENOUS at 09:11

## 2021-08-20 RX ADMIN — LORAZEPAM 2 MG: 2 INJECTION INTRAMUSCULAR; INTRAVENOUS at 05:12

## 2021-08-20 RX ADMIN — LORAZEPAM 2 MG: 2 INJECTION INTRAMUSCULAR; INTRAVENOUS at 00:16

## 2021-08-20 ASSESSMENT — ENCOUNTER SYMPTOMS
COLOR CHANGE: 0
SHORTNESS OF BREATH: 0
ABDOMINAL DISTENTION: 0
RHINORRHEA: 0
COUGH: 0
ABDOMINAL PAIN: 0
CHEST TIGHTNESS: 0

## 2021-08-20 NOTE — ED NOTES
IV fluids x2 initiated. Vitals updated. Pt restless in bed w/intermittent episodes of falling asleep. Urinal provided, but unable to void. Lights dimmed. Call light within reach. Door and curtain ajar. Will continue to monitor for safety and comfort.       Mercedes Gandara RN  08/20/21 8268

## 2021-08-20 NOTE — ED PROVIDER NOTES
tenderness. Abdominal: Soft. Bowel sounds are normal.  exhibits no distension. There is no rebound and no guarding. Extremities: No edema, no cyanosis, no clubbing. Musculoskeletal: No major deformities noted. Neurological: Alert, moving upper lower extremities spontaneously,no focal deficits. GCS 15  Skin: Skin is warm, dry and intact. No rash noted. No erythema. DIFFERENTIAL DIAGNOSIS:       DIAGNOSTIC RESULTS     EKG: All EKG's are interpreted by the Emergency Department Physician who either signs or Co-signs this chart in the absence of a cardiologist.      RADIOLOGY: non-plain film images(s) such as CT, Ultrasound and MRI are read by the radiologist.  Plain radiographic images are visualized and preliminarily interpreted by the emergency physician unless otherwise stated below. LABS:   Labs Reviewed   CBC WITH AUTO DIFFERENTIAL - Abnormal; Notable for the following components:       Result Value    RBC 4.49 (*)     .2 (*)     MCHC 32.0 (*)     RDW-SD 49.6 (*)     MPV 9.2 (*)     All other components within normal limits   COMPREHENSIVE METABOLIC PANEL W/ REFLEX TO MG FOR LOW K - Abnormal; Notable for the following components:    Glucose 236 (*)     CREATININE 1.4 (*)     CO2 21 (*)     AST 51 (*)     All other components within normal limits   CK - Abnormal; Notable for the following components: Total  (*)     All other components within normal limits   SALICYLATE LEVEL - Abnormal; Notable for the following components:    Salicylate, Serum < 0.3 (*)     All other components within normal limits   ANION GAP - Abnormal; Notable for the following components:    Anion Gap 23.0 (*)     All other components within normal limits   GLOMERULAR FILTRATION RATE, ESTIMATED - Abnormal; Notable for the following components:    Est, Glom Filt Rate 56 (*)     All other components within normal limits   CK - Abnormal; Notable for the following components:     Total CK 1,365 (*)     All other components within normal limits   LIPASE   URINE DRUG SCREEN   ETHANOL   OSMOLALITY       EMERGENCY DEPARTMENT COURSE:   Vitals:    Vitals:    08/20/21 0250 08/20/21 0517 08/20/21 0914 08/20/21 1132   BP: 104/65 121/66 106/69    Pulse: 80 90 62 72   Resp: 22 20 16 19   Temp:       TempSrc:       SpO2: 94% 96% 99% 98%   Weight:         Patient presenting with complaint of drug intoxication, unclear what patient took but he appears to be highly agitated. CRITICAL CARE:       CONSULTS:  None    PROCEDURES:  None    FINAL IMPRESSION      1. Altered mental status, unspecified altered mental status type    2. Marijuana abuse    3. Exertional rhabdomyolysis          DISPOSITION/PLAN   Decision To Discharge    PATIENT REFERRED TO:  Jose Daniel Recinos MD  [facility], [address]  [place] [address]    Schedule an appointment as soon as possible for a visit in 1 week        DISCHARGE MEDICATIONS:  There are no discharge medications for this patient.       (Please note that portions of this note were completed with a voice recognition program.  Efforts were made to edit the dictations but occasionally words are mis-transcribed.)    Darlyn Connors, DO Darlyn Connors DO  08/23/21 5892

## 2021-08-20 NOTE — ED NOTES
Pt resting in bed snoring. Side rails up x2.  Bed alarm on      Alice Gores, [place]  08/20/21 9853

## 2021-08-20 NOTE — ED NOTES
Pt bucking off the bed again and is starting to yell out again. When addressed, pt states \"I'm sorry. I don't even know what I'm doing. \" Spoke with Dr Iva Hernandez and orders obtained at this time. Pt medicated per MAR. Pt continues to deny pain. IV flushed and shows no s/s of infection or infiltration. Monitor reapplied and VS as noted. Will monitor.      Ita Elias, JANINA  08/20/21 1914

## 2021-08-20 NOTE — ED NOTES
Jesús Soler asking for an update.  Call back number [phone]     Oswaldo Fabian  08/19/21 7774 no

## 2021-08-20 NOTE — ED NOTES
Upon first encounter with pt, pt states \"the  brought me in on some stupid shit. \" Pt reports having one 24oz beer \"earlier today. \" pt denies pain. Pt is A&Ox4. Pt refused IV and IV fluids. EKG completed. VSS.      Deanna Retana RN  08/20/21 3834

## 2021-08-20 NOTE — ED NOTES
Pt to ED via [facility] for c/o possible drug intox and being belligerent. EMS reports they were called to the scene by [facility] for \"seizure like activity. \" It is reported that pt showed up to his friends [employer] and was playing basketball outside. Approx 30 mins after pt arrival to [facility], pt went into a manic like state, sweating profusely and \"doing 100 push ups,\" Upon pt arrival, pt belligerent, yelling \"I'm f*cking stupid. I'm a f*cking idiot. \" Pt would darling off the bed, sit straight up then throw self back onto bed. Unable to redirect or calm pt. Provider called to bedside and verbal order for restraint chair obtained. Pt restrained at this time by ER staff and [place] police officers. Verbal order for 2mg Narcan intranasally, given with no affect on patient. IV established with blood drawn and sent to lab. Verbal order obtained for 2mg Ativan IVP by Dr Abraham Elliott. Ativan administered and pt immediately calmed and appeared to fall asleep. Pt awoken by verbal and sternal stimulation.       Ana Mejia RN  08/19/21 0537

## 2021-08-20 NOTE — ED NOTES
Friend called for ride. No answer. Pt continues to yell and be restless.  Will monitor      Ro Delcid RN  08/20/21 9601

## 2021-08-20 NOTE — ED PROVIDER NOTES
Transfer of Care Note:   Physician Signing out: Balaji Cummings MD  Receiving Physician: Marlin Gatica M.D. Sign out time: 0700      Brief history: Altered mental status and agitation after smoking marijuana and possibly consuming other drugs. Patient was agitated after playing basketball and disoriented and data 100 push-ups during that episode. Patient was still hyperactive on arrival to the emergency department and required sedation for personal and staff protection. Items pending that need to be checked:  Labs, IV fluids  Reevaluation for sobriety      Tentative Impression of patient:  1. At mental status, likely from drugs    Expected disposition of patient:  Pending results, discharged. Additional Assessment and results:   I have personally performed a face to face diagnostic evaluation on this patient. The patient's initial evaluation and plan have been discussed with the prior physician who initially evaluated the patient. Nursing Notes, Past Medical Hx, Past Surgical Hx, Social Hx, Allergies, vital signs and Family Hx were all reviewed. Vitals:    08/20/21 1132   BP:    Pulse: 72   Resp: 19   Temp:    SpO2: 98%     Physical Exam  Patient has been waking up progressively during observation. On my last evaluation, see note below, patient was fully awake and oriented and offering no complaints. Labs Reviewed   CBC WITH AUTO DIFFERENTIAL - Abnormal; Notable for the following components:       Result Value    RBC 4.49 (*)     .2 (*)     MCHC 32.0 (*)     RDW-SD 49.6 (*)     MPV 9.2 (*)     All other components within normal limits   COMPREHENSIVE METABOLIC PANEL W/ REFLEX TO MG FOR LOW K - Abnormal; Notable for the following components:    Glucose 236 (*)     CREATININE 1.4 (*)     CO2 21 (*)     AST 51 (*)     All other components within normal limits   CK - Abnormal; Notable for the following components:     Total  (*)     All other components within normal limits   SALICYLATE LEVEL - Abnormal; Notable for the following components:    Salicylate, Serum < 0.3 (*)     All other components within normal limits   ANION GAP - Abnormal; Notable for the following components:    Anion Gap 23.0 (*)     All other components within normal limits   GLOMERULAR FILTRATION RATE, ESTIMATED - Abnormal; Notable for the following components:    Est, Glom Filt Rate 56 (*)     All other components within normal limits   CK - Abnormal; Notable for the following components: Total CK 1,365 (*)     All other components within normal limits   LIPASE   URINE DRUG SCREEN   ETHANOL   OSMOLALITY         Medications   oxyCODONE-acetaminophen (PERCOCET) 5-325 MG per tablet 1 tablet ( Oral Canceled Entry 8/19/21 2246)   LORazepam (ATIVAN) injection 2 mg (2 mg Intravenous Given 8/20/21 0512)   0.9 % sodium chloride bolus (0 mLs Intravenous Held 8/20/21 0412)   LORazepam (ATIVAN) injection 2 mg (2 mg Intravenous Given 8/19/21 2152)   0.9 % sodium chloride bolus (0 mLs Intravenous Stopped 8/20/21 0912)   0.9 % sodium chloride bolus (0 mLs Intravenous Stopped 8/20/21 1131)         No orders to display         ED Course as of Aug 20 1344   Fri Aug 20, 2021   0750 Reevaluated patient, awake, slightly hyperactive but not agitated at this moment. Order new CK and large IV fluid bolus. We will continue observing for sobriety, will reevaluate after IV hydration. [DT]   2576 Reevaluated patient, somnolent but easily arousable, he acknowledges having used marijuana yesterday, denies having used any other drugs. He is oriented x3, knows how to get home and knows where his home is. He knows where he is at the moment as well. We will continue observing until fully awake from sedation given earlier for likely discharge home. [DT]      ED Course User Index  [DT] Lilliana Argueta MD         Further MDM and disposition:   Assessment:   1. Altered mental status, drug-induced.   2. Mild exertional rhabdomyolysis  Plan:    Will

## 2021-08-20 NOTE — ED NOTES
Spoke with mother, Mery Guevara and gave update with patient permission. Mother asked for updates called to [phone].      J Carlos Denise RN  08/20/21 5597

## 2021-08-20 NOTE — ED NOTES
ED nurse-to-nurse bedside report    Chief Complaint   Patient presents with    Altered Mental Status     Poss drug intox      LOC: alert and orientated to name, place, date  Vital signs   Vitals:    08/19/21 2201 08/19/21 2311 08/20/21 0013   BP: 115/71 104/66 104/60   Pulse: 110 95 101   Resp: 20 20 20   Temp: 99.3 °F (37.4 °C)     TempSrc: Oral     SpO2: 92% 95% 99%   Weight: 160 lb (72.6 kg)        Pain:    Pain Interventions: NA  Pain Goal: NA  Oxygen: No    Current needs required Room Air   Telemetry: Yes  LDAs:   Peripheral IV 08/19/21 Right Forearm (Active)   Site Assessment Dry;Clean; Intact 08/20/21 0014   Line Status Normal saline locked 08/20/21 0014   Dressing Status Clean;Dry; Intact 08/20/21 0014   Dressing Intervention New 08/19/21 2313     Continuous Infusions:   Mobility: Requires assistance * 1  Arias Fall Risk Score: No flowsheet data found. Fall Interventions: Side rails x2, call light within reach  Report given to: Meng Morrissey.        Jasmin Fritz RN  08/20/21 9549

## 2021-08-20 NOTE — ED PROVIDER NOTES
Signed out to me at shift change. This patient has been seen and evaluated by previous shift physician, Dr. Vilma Landis. Please refer to his/her note for detailed history of present illness, physical exam and medical decision making. Signed out time 2:20 AM. I was signed out to follow up ED observation. I have personally seen and evaluated this patient at time of sign-out. 80-year-old male brought in because acute psychosis, likely related to substance abuse. Patient is agitated and restless, responding well to 2 mg of Ativan. Pending urine drug screen. Labs are reassuring except for glucose 236, likely acute stress related. UDS shows positive marijuana. He became more agitated, repeat 2 mg IV lorazepam is given. Signed out to Dr. Ab Akins at shift change.      VITALS  Vitals:    08/20/21 0013 08/20/21 0154 08/20/21 0250 08/20/21 0517   BP: 104/60 111/69 104/65 121/66   Pulse: 101 98 80 90   Resp: 20 17 22 20   Temp:       TempSrc:       SpO2: 99% 98% 94% 96%   Weight:             LABS  Results for orders placed or performed during the hospital encounter of 08/19/21   CBC Auto Differential   Result Value Ref Range    WBC 9.1 4.8 - 10.8 thou/mm3    RBC 4.49 (L) 4.70 - 6.10 mill/mm3    Hemoglobin 14.4 14.0 - 18.0 gm/dl    Hematocrit 45.0 42.0 - 52.0 %    .2 (H) 80.0 - 94.0 fL    MCH 32.1 26.0 - 33.0 pg    MCHC 32.0 (L) 32.2 - 35.5 gm/dl    RDW-CV 13.4 11.5 - 14.5 %    RDW-SD 49.6 (H) 35.0 - 45.0 fL    Platelets 424 275 - 979 thou/mm3    MPV 9.2 (L) 9.4 - 12.4 fL    Seg Neutrophils 44.4 %    Lymphocytes 45.9 %    Monocytes 6.9 %    Eosinophils 1.8 %    Basophils 0.3 %    Immature Granulocytes 0.7 %    Segs Absolute 4.0 1 - 7 thou/mm3    Lymphocytes Absolute 4.2 1.0 - 4.8 thou/mm3    Monocytes Absolute 0.6 0.4 - 1.3 thou/mm3    Eosinophils Absolute 0.2 0.0 - 0.4 thou/mm3    Basophils Absolute 0.0 0.0 - 0.1 thou/mm3    Immature Grans (Abs) 0.06 0.00 - 0.07 thou/mm3    nRBC 0 /100 wbc   Comprehensive Metabolic Panel w/ Reflex to MG   Result Value Ref Range    Glucose 236 (H) 70 - 108 mg/dL    CREATININE 1.4 (H) 0.4 - 1.2 mg/dL    BUN 14 7 - 22 mg/dL    Sodium 143 135 - 145 meq/L    Potassium reflex Magnesium 4.8 3.5 - 5.2 meq/L    Chloride 99 98 - 111 meq/L    CO2 21 (L) 23 - 33 meq/L    Calcium 10.0 8.5 - 10.5 mg/dL    AST 51 (H) 5 - 40 U/L    Alkaline Phosphatase 103 38 - 126 U/L    Total Protein 7.5 6.1 - 8.0 g/dL    Albumin 4.8 3.5 - 5.1 g/dL    Total Bilirubin 0.5 0.3 - 1.2 mg/dL    ALT 45 11 - 66 U/L   Lipase   Result Value Ref Range    Lipase 27.9 5.6 - 51.3 U/L   CK   Result Value Ref Range    Total  (H) 55 - 170 U/L   Urine Drug Screen   Result Value Ref Range    AMPHETAMINE+METHAMPHETAMINE URINE SCREEN Negative NEGATIVE    Barbiturate Quant, Ur Negative NEGATIVE    Benzodiazepine Quant, Ur Negative NEGATIVE    Cannabinoid Quant, Ur POSITIVE NEGATIVE    Cocaine Metab Quant, Ur Negative NEGATIVE    Opiates, Urine POSITIVE NEGATIVE    Oxycodone Negative NEGATIVE    PCP Quant, Ur Negative NEGATIVE   Ethanol   Result Value Ref Range    ETHYL ALCOHOL, SERUM < 7.47 1.85 %   Salicylate   Result Value Ref Range    Salicylate, Serum < 0.3 (L) 2.0 - 10.0 mg/dL   Anion Gap   Result Value Ref Range    Anion Gap 23.0 (H) 8.0 - 16.0 meq/L   Glomerular Filtration Rate, Estimated   Result Value Ref Range    Est, Glom Filt Rate 56 (A) ml/min/1.73m2   Osmolality   Result Value Ref Range    Osmolality Calc 293.1 275.0 - 300.0 mOsmol/kg         RADIOLOGY  No orders to display         ED MEDICATIONS  Medications   oxyCODONE-acetaminophen (PERCOCET) 5-325 MG per tablet 1 tablet ( Oral Canceled Entry 8/19/21 2246)   LORazepam (ATIVAN) injection 2 mg (2 mg Intravenous Given 8/20/21 5712)   0.9 % sodium chloride bolus (0 mLs Intravenous Held 8/20/21 0832)   0.9 % sodium chloride bolus (has no administration in time range)   LORazepam (ATIVAN) injection 2 mg (2 mg Intravenous Given 8/19/21 7197)         DIAGNOSIS  1. Altered mental status, unspecified altered mental status type    2. Marijuana abuse          DISPOSITION and PLAN  ED observation and signedout to Dr. Micky More.      MEDICATIONS ON DISCHARGE  New Prescriptions    No medications on file         Eugene Vazquez M.D.       Eugene Vazquez MD  08/20/21 9792

## 2021-08-20 NOTE — ED NOTES
IV fluids completed. Pt alert and oriented to person and place. Dr. Suzie Aguilera updated.       Mercedes Gandara RN  08/20/21 0708

## 2021-08-20 NOTE — ED NOTES
Dr. Lee Payer in to assess pt. Pt restless then falls asleep. Will monitor.       Chuck Phillips RN  08/20/21 1619

## 2021-08-20 NOTE — ED NOTES
Straight cath urine obtained.  Sent to lab      Ro Delcid, [address] [place] [address]  08/20/21 0193

## 2021-08-20 NOTE — ED NOTES
Bed alarming. Pt itching and jostling  body around in the bed. Pt asked to lay down. Pt responds \"okay im sorry\". Bed alarm remains on. Will monitor.       Nidia Marroquin RN  08/20/21 8146

## 2021-08-20 NOTE — ED TRIAGE NOTES
Patient was brought to the ED by by two police officers from [facility] per call from the neighborhood. Patient admits to \"drinking few beer earlier in the day\". Patient denies taken any other drugs. Denies suicidal and homicidal ideation. Patient is in bed with eyes open. Bedside table and call light within reach.

## 2021-08-20 NOTE — ED NOTES
Pt resting in bed, respirs unlabored at this time.  Call light remains in reach     Thai Pulse  08/20/21 0154

## 2021-08-20 NOTE — ED NOTES
ED nurse-to-nurse bedside report    Chief Complaint   Patient presents with    Altered Mental Status     Poss drug intox      LOC: alert and orientated to name and place  Vital signs   Vitals:    08/20/21 0013 08/20/21 0154 08/20/21 0250 08/20/21 0517   BP: 104/60 111/69 104/65 121/66   Pulse: 101 98 80 90   Resp: 20 17 22 20   Temp:       TempSrc:       SpO2: 99% 98% 94% 96%   Weight:          Pain:    Pain Interventions: none  Pain Goal: none  Oxygen: No    Current needs required RA   Telemetry: No  LDAs:    Continuous Infusions:   Mobility: Fully dependent  Arias Fall Risk Score: No flowsheet data found.   Fall Interventions: bed alarm, side rails up x2  Report given to: Odalys Marroquin RN  08/20/21 1378

## 2021-08-20 NOTE — ED NOTES
Pt DC, instructions and f/u care reviewd. [facility] cab offered. Pt refused. Pt provided w/scrub top.       Jason Del Castillo, RN  08/20/21 8448

## 2021-08-20 NOTE — ED NOTES
Pt resting on cot. Door ajar. Bed alarm in place. Pt fidgeting. No cardiac leads or monitor in place due to pt pulling leads off. Breathing easy and unlabored on RA. Will continue to monitor for safety and comfort.       Nayeli Jeff RN  08/20/21 0788

## 2021-08-21 ENCOUNTER — APPOINTMENT (OUTPATIENT)
Dept: GENERAL RADIOLOGY | Age: 41
End: 2021-08-21
Payer: MEDICAID

## 2021-08-21 VITALS
RESPIRATION RATE: 16 BRPM | SYSTOLIC BLOOD PRESSURE: 108 MMHG | DIASTOLIC BLOOD PRESSURE: 64 MMHG | HEART RATE: 73 BPM | TEMPERATURE: 97.9 F | OXYGEN SATURATION: 99 %

## 2021-08-21 LAB
ACETAMINOPHEN LEVEL: < 5 UG/ML (ref 0–20)
ALBUMIN SERPL-MCNC: 4.1 G/DL (ref 3.5–5.1)
ALP BLD-CCNC: 80 U/L (ref 38–126)
ALT SERPL-CCNC: 52 U/L (ref 11–66)
AMMONIA: 18 UMOL/L (ref 11–60)
ANION GAP SERPL CALCULATED.3IONS-SCNC: 11 MEQ/L (ref 8–16)
AST SERPL-CCNC: 75 U/L (ref 5–40)
BASOPHILS # BLD: 0.4 %
BASOPHILS ABSOLUTE: 0 THOU/MM3 (ref 0–0.1)
BILIRUB SERPL-MCNC: 0.9 MG/DL (ref 0.3–1.2)
BUN BLDV-MCNC: 19 MG/DL (ref 7–22)
CALCIUM SERPL-MCNC: 8.7 MG/DL (ref 8.5–10.5)
CHLORIDE BLD-SCNC: 102 MEQ/L (ref 98–111)
CO2: 24 MEQ/L (ref 23–33)
CREAT SERPL-MCNC: 0.8 MG/DL (ref 0.4–1.2)
EKG ATRIAL RATE: 59 BPM
EKG ATRIAL RATE: 61 BPM
EKG P AXIS: 61 DEGREES
EKG P AXIS: 67 DEGREES
EKG P-R INTERVAL: 114 MS
EKG P-R INTERVAL: 132 MS
EKG Q-T INTERVAL: 428 MS
EKG Q-T INTERVAL: 456 MS
EKG QRS DURATION: 96 MS
EKG QRS DURATION: 98 MS
EKG QTC CALCULATION (BAZETT): 423 MS
EKG QTC CALCULATION (BAZETT): 459 MS
EKG R AXIS: 79 DEGREES
EKG R AXIS: 91 DEGREES
EKG T AXIS: 58 DEGREES
EKG T AXIS: 6 DEGREES
EKG VENTRICULAR RATE: 59 BPM
EKG VENTRICULAR RATE: 61 BPM
EOSINOPHIL # BLD: 1.2 %
EOSINOPHILS ABSOLUTE: 0.1 THOU/MM3 (ref 0–0.4)
ERYTHROCYTE [DISTWIDTH] IN BLOOD BY AUTOMATED COUNT: 13.6 % (ref 11.5–14.5)
ERYTHROCYTE [DISTWIDTH] IN BLOOD BY AUTOMATED COUNT: 49.9 FL (ref 35–45)
ETHYL ALCOHOL, SERUM: < 0.01 %
GFR SERPL CREATININE-BSD FRML MDRD: > 90 ML/MIN/1.73M2
GLUCOSE BLD-MCNC: 80 MG/DL (ref 70–108)
HCT VFR BLD CALC: 39 % (ref 42–52)
HEMOGLOBIN: 12.6 GM/DL (ref 14–18)
IMMATURE GRANS (ABS): 0.04 THOU/MM3 (ref 0–0.07)
IMMATURE GRANULOCYTES: 0.5 %
LYMPHOCYTES # BLD: 9.3 %
LYMPHOCYTES ABSOLUTE: 0.8 THOU/MM3 (ref 1–4.8)
MCH RBC QN AUTO: 32 PG (ref 26–33)
MCHC RBC AUTO-ENTMCNC: 32.3 GM/DL (ref 32.2–35.5)
MCV RBC AUTO: 99 FL (ref 80–94)
MONOCYTES # BLD: 8.9 %
MONOCYTES ABSOLUTE: 0.7 THOU/MM3 (ref 0.4–1.3)
NUCLEATED RED BLOOD CELLS: 0 /100 WBC
OSMOLALITY CALCULATION: 275.1 MOSMOL/KG (ref 275–300)
PLATELET # BLD: 243 THOU/MM3 (ref 130–400)
PMV BLD AUTO: 8.8 FL (ref 9.4–12.4)
POTASSIUM SERPL-SCNC: 3.9 MEQ/L (ref 3.5–5.2)
RBC # BLD: 3.94 MILL/MM3 (ref 4.7–6.1)
SALICYLATE, SERUM: 0.5 MG/DL (ref 2–10)
SEG NEUTROPHILS: 79.7 %
SEGMENTED NEUTROPHILS ABSOLUTE COUNT: 6.6 THOU/MM3 (ref 1.8–7.7)
SODIUM BLD-SCNC: 137 MEQ/L (ref 135–145)
TOTAL CK: 1609 U/L (ref 55–170)
TOTAL PROTEIN: 6.4 G/DL (ref 6.1–8)
TROPONIN T: < 0.01 NG/ML
TSH SERPL DL<=0.05 MIU/L-ACNC: 2.25 UIU/ML (ref 0.4–4.2)
WBC # BLD: 8.3 THOU/MM3 (ref 4.8–10.8)

## 2021-08-21 PROCEDURE — 2580000003 HC RX 258: Performed by: EMERGENCY MEDICINE

## 2021-08-21 PROCEDURE — 80179 DRUG ASSAY SALICYLATE: CPT

## 2021-08-21 PROCEDURE — 82077 ASSAY SPEC XCP UR&BREATH IA: CPT

## 2021-08-21 PROCEDURE — 82140 ASSAY OF AMMONIA: CPT

## 2021-08-21 PROCEDURE — 80143 DRUG ASSAY ACETAMINOPHEN: CPT

## 2021-08-21 PROCEDURE — 84443 ASSAY THYROID STIM HORMONE: CPT

## 2021-08-21 PROCEDURE — 80053 COMPREHEN METABOLIC PANEL: CPT

## 2021-08-21 PROCEDURE — 6360000002 HC RX W HCPCS: Performed by: EMERGENCY MEDICINE

## 2021-08-21 PROCEDURE — 84484 ASSAY OF TROPONIN QUANT: CPT

## 2021-08-21 PROCEDURE — 85025 COMPLETE CBC W/AUTO DIFF WBC: CPT

## 2021-08-21 PROCEDURE — 93010 ELECTROCARDIOGRAM REPORT: CPT | Performed by: INTERNAL MEDICINE

## 2021-08-21 PROCEDURE — 93005 ELECTROCARDIOGRAM TRACING: CPT | Performed by: EMERGENCY MEDICINE

## 2021-08-21 PROCEDURE — 82550 ASSAY OF CK (CPK): CPT

## 2021-08-21 PROCEDURE — 71045 X-RAY EXAM CHEST 1 VIEW: CPT

## 2021-08-21 PROCEDURE — 36415 COLL VENOUS BLD VENIPUNCTURE: CPT

## 2021-08-21 RX ORDER — NALOXONE HYDROCHLORIDE 4 MG/.1ML
1 SPRAY NASAL PRN
Qty: 1 EACH | Refills: 0 | Status: SHIPPED | OUTPATIENT
Start: 2021-08-21

## 2021-08-21 RX ORDER — 0.9 % SODIUM CHLORIDE 0.9 %
2000 INTRAVENOUS SOLUTION INTRAVENOUS ONCE
Status: COMPLETED | OUTPATIENT
Start: 2021-08-21 | End: 2021-08-21

## 2021-08-21 RX ORDER — NALOXONE HYDROCHLORIDE 0.4 MG/ML
0.4 INJECTION, SOLUTION INTRAMUSCULAR; INTRAVENOUS; SUBCUTANEOUS ONCE
Status: COMPLETED | OUTPATIENT
Start: 2021-08-21 | End: 2021-08-21

## 2021-08-21 RX ADMIN — SODIUM CHLORIDE 2000 ML: 9 INJECTION, SOLUTION INTRAVENOUS at 00:30

## 2021-08-21 RX ADMIN — NALOXONE HYDROCHLORIDE 0.4 MG: 0.4 INJECTION, SOLUTION INTRAMUSCULAR; INTRAVENOUS; SUBCUTANEOUS at 00:30

## 2021-08-21 ASSESSMENT — ENCOUNTER SYMPTOMS
EYE PAIN: 0
BACK PAIN: 0
ABDOMINAL DISTENTION: 0
CHEST TIGHTNESS: 0
DIARRHEA: 0
NAUSEA: 0
EYE REDNESS: 0
VOMITING: 0
SHORTNESS OF BREATH: 0
WHEEZING: 0
CONSTIPATION: 0
COUGH: 0
STRIDOR: 0
ABDOMINAL PAIN: 0
SORE THROAT: 0
EYE ITCHING: 0
EYE DISCHARGE: 0
RHINORRHEA: 0
PHOTOPHOBIA: 0

## 2021-08-21 NOTE — ED NOTES
Patient attempting to urinate at this time. Patient resting in bed. Respirations easy and unlabored. No distress noted. Call light within reach.        Asher Love RN  08/21/21 6976

## 2021-08-21 NOTE — ED PROVIDER NOTES
[address] ENCOUNTER      PATIENT NAME: Armando Lamar  MRN: [id number]  : 1980  BARRERA: 2021  PROVIDER: Jennifer Gonzalez MD      CHIEF COMPLAINT       Chief Complaint   Patient presents with    Drug Overdose       Patient is seen and evaluated in a timely fashion. Nurses Notes are reviewed and I agree except as noted in the HPI. HISTORY OF PRESENT ILLNESS    Armando Lamar is a 39 y.o. male who presents to Emergency Department with Drug Overdose     Patient presents to ED with polysubstance abuse. He says he is here because \"decision-making issue\". Past medical history remarkable for polysubstance abuse including fentanyl, Percocet, heroin, and marijuana over last 4 years. Patient was seen yesterday and this morning in ED for agitation. Patient has no suicidal or homicidal ideation. This HPI was provided by patient. REVIEW OF SYSTEMS   Review of Systems   Constitutional: Negative for activity change, appetite change, chills, fatigue, fever and unexpected weight change. HENT: Negative for congestion, ear discharge, ear pain, hearing loss, nosebleeds, rhinorrhea and sore throat. Eyes: Negative for photophobia, pain, discharge, redness and itching. Respiratory: Negative for cough, chest tightness, shortness of breath, wheezing and stridor. Cardiovascular: Negative for chest pain, palpitations and leg swelling. Gastrointestinal: Negative for abdominal distention, abdominal pain, constipation, diarrhea, nausea and vomiting. Endocrine: Negative for cold intolerance, heat intolerance, polydipsia and polyphagia. Genitourinary: Negative for dysuria, flank pain, frequency and hematuria. Musculoskeletal: Negative for arthralgias, back pain, gait problem, myalgias, neck pain and neck stiffness. Skin: Negative for pallor, rash and wound. Allergic/Immunologic: Negative for environmental allergies and food allergies.    Neurological: Negative for dizziness, tremors, syncope, weakness and headaches. Psychiatric/Behavioral: Positive for agitation, behavioral problems, confusion, decreased concentration, dysphoric mood and sleep disturbance. Negative for self-injury and suicidal ideas. The patient is nervous/anxious. PAST MEDICAL HISTORY   History reviewed. No pertinent past medical history. SURGICAL HISTORY     History reviewed. No pertinent surgical history. CURRENT MEDICATIONS       Previous Medications    No medications on file       ALLERGIES     Patient has no known allergies. FAMILY HISTORY     has no family status information on file. family history is not on file. SOCIAL HISTORY      reports that he has been smoking. He has been smoking about 0.50 packs per day. He has never used smokeless tobacco. He reports current alcohol use. He reports current drug use. PHYSICAL EXAM      oral temperature is 97.9 °F (36.6 °C). His blood pressure is 108/64 and his pulse is 73. His respiration is 16 and oxygen saturation is 99%. Physical Exam  Vitals and nursing note reviewed. Constitutional:       Appearance: He is well-developed. He is not diaphoretic. HENT:      Head: Normocephalic and atraumatic. Nose: Nose normal.   Eyes:      General: No scleral icterus. Right eye: No discharge. Left eye: No discharge. Conjunctiva/sclera: Conjunctivae normal.      Pupils: Pupils are equal, round, and reactive to light. Neck:      Vascular: No JVD. Trachea: No tracheal deviation. Cardiovascular:      Rate and Rhythm: Normal rate and regular rhythm. Heart sounds: Normal heart sounds. No murmur heard. No friction rub. No gallop. Pulmonary:      Effort: Pulmonary effort is normal. No respiratory distress. Breath sounds: Normal breath sounds. No stridor. No wheezing or rales. Chest:      Chest wall: No tenderness. Abdominal:      General: Bowel sounds are normal. There is no distension. Palpations: Abdomen is soft. There is no mass. Tenderness: There is no abdominal tenderness. There is no guarding or rebound. Hernia: No hernia is present. Musculoskeletal:         General: No tenderness or deformity. Cervical back: Normal range of motion and neck supple. Lymphadenopathy:      Cervical: No cervical adenopathy. Skin:     General: Skin is warm and dry. Capillary Refill: Capillary refill takes less than 2 seconds. Coloration: Skin is not pale. Findings: No erythema or rash. Neurological:      Mental Status: He is alert and oriented to person, place, and time. Cranial Nerves: No cranial nerve deficit. Sensory: No sensory deficit. Motor: No abnormal muscle tone. Coordination: Coordination normal.      Deep Tendon Reflexes: Reflexes normal.         ANCILLARY TEST RESULTS   EKG:    Interpreted by me  Normal sinus rhythm, ventricular rate 61 bpm, OR interval 132 ms, QRS duration 98 ms,  ms, no ST elevation or acute T wave    LAB RESULTS:  Results for orders placed or performed during the hospital encounter of 08/20/21   CBC Auto Differential   Result Value Ref Range    WBC 8.3 4.8 - 10.8 thou/mm3    RBC 3.94 (L) 4.70 - 6.10 mill/mm3    Hemoglobin 12.6 (L) 14.0 - 18.0 gm/dl    Hematocrit 39.0 (L) 42.0 - 52.0 %    MCV 99.0 (H) 80.0 - 94.0 fL    MCH 32.0 26.0 - 33.0 pg    MCHC 32.3 32.2 - 35.5 gm/dl    RDW-CV 13.6 11.5 - 14.5 %    RDW-SD 49.9 (H) 35.0 - 45.0 fL    Platelets 609 688 - 123 thou/mm3    MPV 8.8 (L) 9.4 - 12.4 fL    Seg Neutrophils 79.7 %    Lymphocytes 9.3 %    Monocytes 8.9 %    Eosinophils 1.2 %    Basophils 0.4 %    Immature Granulocytes 0.5 %    Segs Absolute 6.6 1 - 7 thou/mm3    Lymphocytes Absolute 0.8 (L) 1.0 - 4.8 thou/mm3    Monocytes Absolute 0.7 0.4 - 1.3 thou/mm3    Eosinophils Absolute 0.1 0.0 - 0.4 thou/mm3    Basophils Absolute 0.0 0.0 - 0.1 thou/mm3    Immature Grans (Abs) 0.04 0.00 - 0.07 thou/mm3    nRBC 0 /100 wbc Comprehensive Metabolic Panel   Result Value Ref Range    Glucose 80 70 - 108 mg/dL    CREATININE 0.8 0.4 - 1.2 mg/dL    BUN 19 7 - 22 mg/dL    Sodium 137 135 - 145 meq/L    Potassium 3.9 3.5 - 5.2 meq/L    Chloride 102 98 - 111 meq/L    CO2 24 23 - 33 meq/L    Calcium 8.7 8.5 - 10.5 mg/dL    AST 75 (H) 5 - 40 U/L    Alkaline Phosphatase 80 38 - 126 U/L    Total Protein 6.4 6.1 - 8.0 g/dL    Albumin 4.1 3.5 - 5.1 g/dL    Total Bilirubin 0.9 0.3 - 1.2 mg/dL    ALT 52 11 - 66 U/L   Ethanol   Result Value Ref Range    ETHYL ALCOHOL, SERUM < 5.51 0.86 %   Salicylate Level   Result Value Ref Range    Salicylate, Serum 0.5 (L) 2.0 - 10.0 mg/dL   Acetaminophen level   Result Value Ref Range    Acetaminophen Level < 5.0 0.0 - 20.0 ug/mL   TSH with Reflex   Result Value Ref Range    TSH 2.250 0.400 - 4.200 uIU/mL   Troponin   Result Value Ref Range    Troponin T < 0.010 ng/ml   Ammonia   Result Value Ref Range    Ammonia 18 11 - 60 umol/L   Anion Gap   Result Value Ref Range    Anion Gap 11.0 8.0 - 16.0 meq/L   Glomerular Filtration Rate, Estimated   Result Value Ref Range    Est, Glom Filt Rate >90 ml/min/1.73m2   Osmolality   Result Value Ref Range    Osmolality Calc 275.1 275.0 - 300.0 mOsmol/kg   EKG 12 Lead   Result Value Ref Range    Ventricular Rate 61 BPM    Atrial Rate 61 BPM    P-R Interval 132 ms    QRS Duration 98 ms    Q-T Interval 456 ms    QTc Calculation (Bazett) 459 ms    P Axis 67 degrees    R Axis 91 degrees    T Axis 6 degrees       RADIOLOGY REPORTS  XR CHEST PORTABLE   Final Result   Impression:      Right medial base nonspecific consolidation. Question pneumonia versus aspiration.       This document has been electronically signed by: Ab Brown MD on    08/21/2021 12:56 AM          MEDICAL [address] [place] [address] ED COURSE     ED Vitals:  Vitals:    08/20/21 2240 08/20/21 2330   BP: 134/74 (!) 97/59   Pulse: 92 71   Resp: 17 16   Temp: 97.9 °F (36.6 °C)    TempSrc: Oral    SpO2: 97% 98% Differential diagnsis: Anxiety, depression, polysubstance abuse, polysubstance induced mood disorder    Actions:   Labs  EKG  Chest x-ray  Behavioral health consultation    Medications   naloxone [facility]) injection 0.4 mg (0.4 mg Intravenous Given 8/21/21 0030)   0.9 % sodium chloride bolus (0 mLs Intravenous Stopped 8/21/21 0319)     Vital signs are stable. Patient is alert and oriented although he is sleepy during initial and reassessment. Labs are reassuring. Patient's main issue is opiate addiction. I recommend follow-up closely with Dr. Te Hoffman at [facility] clinic. Patient is discharged with Narcan prescribed. CRITICAL CARE   None    CONSULTS   None    PROCEDURES   None    FINAL IMPRESSION AND DISPOSITION      1. Opioid dependence with opioid-induced disorder [facility])        Discharge home    PATIENT REFERRED TO:  Dnadre De La Torre MD  [address]  [phone]    On 8/23/2021  Call Monday to schedule appointment.  Dr. Te Hoffman works in the methadone clinic, a treatment facility for a person addicted to opioid drugs      DISCHARGE MEDICATIONS:  New Prescriptions    NALOXONE 4 MG/0.1ML LIQD NASAL SPRAY    1 spray by Nasal route as needed for Opioid Reversal       (Please note that portions of this note were completed with a voice recognition program.  Efforts were made to edit the dictations but occasionally words aremis-transcribed.)    MD Eugene Ahuja MD  08/21/21 3085

## 2021-08-21 NOTE — ED NOTES
Pt arrives to ED c/o snorting percocet and not acting appropriately. EMS administered 2mg of intranasal narcan to pt for brief periods of \"going in and out\" pt arrives alert and oriented with no signs of distress. Pt denies any pain or needs.       Darlene CALDERA RN  08/20/21 7464

## 2021-08-21 NOTE — ED NOTES
Pt medicated per MAR. Pt tolerated well. Respirations unlabored.       Binta ABURTO, RN  08/21/21 004

## 2021-08-21 NOTE — ED NOTES
Bed: 005A  Expected date: 8/20/21  Expected time: 10:35 PM  Means of arrival:   Comments:     Torrey Bah RN  08/20/21 4991

## 2021-08-21 NOTE — ED NOTES
Patient resting in bed. Respirations easy and unlabored. No distress noted. Call light within reach.        Heath Fuller RN  08/21/21 6218

## 2021-08-22 NOTE — ED PROVIDER NOTES
[facility] EMERGENCY DEPT      CHIEF COMPLAINT       Chief Complaint   Patient presents with    Drug Overdose       Nurses Notes reviewed and I agree except as noted in the HPI. HISTORY OF PRESENT ILLNESS    Sal Villanueva is a 39 y.o. male who presents in a psychotic state, patient agitated, swearing, shouting incomprehensible nonsense. Patient brought in by EMS/police, he was found at a local park in the above state, police states that he is appears to be highly energetic. Patient was doing push-ups and running around the park when the police arrived. Patient is known to EMS for frequent drug abuse/occasional overdose. Currently not clear what patient abused prior to arrival.    REVIEW OF SYSTEMS      Review of Systems   Patient is agitated/unable to provide review of system answers. PAST MEDICAL HISTORY    has no past medical history on file. SURGICAL HISTORY      has no past surgical history on file. CURRENT MEDICATIONS       Discharge Medication List as of 8/21/2021  3:22 AM          ALLERGIES     has No Known Allergies. FAMILY HISTORY     has no family status information on file. family history is not on file. SOCIAL HISTORY      reports that he has been smoking. He has been smoking about 0.50 packs per day. He has never used smokeless tobacco. He reports current alcohol use. He reports current drug use. PHYSICAL EXAM     INITIAL VITALS:  oral temperature is 97.9 °F (36.6 °C). His blood pressure is 108/64 and his pulse is 73. His respiration is 16 and oxygen saturation is 99%. Physical Exam   Constitutional:  well-developed and well-nourished. Diaphoretic. HENT: Head: Normocephalic, atraumatic, Bilateral external ears normal, Oropharynx mosit, No oral exudates, Nose normal.   Eyes: PERRL, EOMI, Conjunctiva normal, No discharge.  No scleral icterus  Neck: Normal range of motion, No tenderness, Supple  Cardiovascular: Normal rate, regular rhythm, S1 normal and S2 normal.  Exam reveals no gallop. Pulmonary/Chest: Effort normal and breath sounds normal. No accessory muscle usage or stridor. No respiratory distress. no wheezes. has no rales. exhibits no tenderness. Abdominal: Soft. Bowel sounds are normal.  exhibits no distension. There is no tenderness. There is no rebound and no guarding. Musculoskeletal: Good range of motion in major joints is observed. No major deformities noted. Neurological: Highly agitated, moving upper lower extremities spontaneously, no apparent focal neuro deficits, GCS 15. Skin: Skin is warm, dry and intact. No rash noted. No erythema. Psychiatric: Highly agitated. DIFFERENTIAL DIAGNOSIS:       DIAGNOSTIC RESULTS     EKG: All EKG's are interpreted by the Emergency Department Physician who either signs or Co-signs this chart in the absence of a cardiologist.      RADIOLOGY: non-plain film images(s) such as CT, Ultrasound and MRI are read by the radiologist.  Plain radiographic images are visualized and preliminarily interpreted by the emergency physician unless otherwise stated below. LABS:   Labs Reviewed   CBC WITH AUTO DIFFERENTIAL - Abnormal; Notable for the following components:       Result Value    RBC 3.94 (*)     Hemoglobin 12.6 (*)     Hematocrit 39.0 (*)     MCV 99.0 (*)     RDW-SD 49.9 (*)     MPV 8.8 (*)     Lymphocytes Absolute 0.8 (*)     All other components within normal limits   COMPREHENSIVE METABOLIC PANEL - Abnormal; Notable for the following components:    AST 75 (*)     All other components within normal limits   SALICYLATE LEVEL - Abnormal; Notable for the following components:    Salicylate, Serum 0.5 (*)     All other components within normal limits   CK - Abnormal; Notable for the following components:     Total CK 1,609 (*)     All other components within normal limits   ETHANOL   ACETAMINOPHEN LEVEL   TSH WITH REFLEX   TROPONIN   AMMONIA   ANION GAP   GLOMERULAR FILTRATION RATE, ESTIMATED   OSMOLALITY   URINE DRUG SCREEN EMERGENCY DEPARTMENT COURSE:   Vitals:    Vitals:    08/20/21 2330 08/21/21 0032 08/21/21 0059 08/21/21 0154   BP: (!) 97/59 101/66 105/63 108/64   Pulse: 71 79 70 73   Resp: 16 18 15 16   Temp:       TempSrc:       SpO2: 98% 100% 100% 99%       Patient presenting in psychotic state, most likely drug induced psychosis. Patient is not contributing to HPI/review of system questions. Patient apparently picked up by police and EMS after pedestrians reported agitated male in a park. He has no obvious signs of trauma that we can see. Patient appears to be responding to benzodiazepines for agitation control. Patient signed out to Dr. Clyde Corbin for final disposition. CRITICAL CARE:       CONSULTS:  None    PROCEDURES:  None    FINAL IMPRESSION      1. Opioid dependence with opioid-induced disorder [facility])          DISPOSITION/PLAN   Decision To Discharge    PATIENT REFERRED TO:  Vilma Zavala MD  [address]  [phone]    On 8/23/2021  Call Monday to schedule appointment.  Dr. Bev Caballero works in the methadone clinic, a treatment facility for a person addicted to opioid drugs      DISCHARGE MEDICATIONS:  Discharge Medication List as of 8/21/2021  3:22 AM      START taking these medications    Details   naloxone 4 MG/0.1ML LIQD nasal spray 1 spray by Nasal route as needed for Opioid Reversal, Disp-1 each, R-0Print             (Please note that portions of this note were completed with a voice recognition program.  Efforts were made to edit the dictations but occasionally words are mis-transcribed.)    Reggie Velazquez, DO Reggie Velazquez,   08/22/21 1022 auscultated w/ stethoscope

## 2021-10-15 ENCOUNTER — HOSPITAL ENCOUNTER (EMERGENCY)
Age: 41
Discharge: HOME | End: 2021-10-15
Attending: EMERGENCY MEDICINE
Payer: MEDICAID

## 2021-10-15 VITALS
DIASTOLIC BLOOD PRESSURE: 62 MMHG | BODY MASS INDEX: 22.19 KG/M2 | OXYGEN SATURATION: 97 % | TEMPERATURE: 98.7 F | HEIGHT: 70 IN | RESPIRATION RATE: 18 BRPM | SYSTOLIC BLOOD PRESSURE: 103 MMHG | HEART RATE: 109 BPM | WEIGHT: 155 LBS

## 2021-10-15 DIAGNOSIS — Z13.9 ENCOUNTER FOR MEDICAL SCREENING EXAMINATION: Primary | ICD-10-CM

## 2021-10-15 LAB
AMPHETAMINE+METHAMPHETAMINE URINE SCREEN: NEGATIVE
BARBITURATE QUANTITATIVE URINE: NEGATIVE
BENZODIAZEPINE QUANTITATIVE URINE: NEGATIVE
CANNABINOID QUANTITATIVE URINE: POSITIVE
COCAINE METABOLITE QUANTITATIVE URINE: POSITIVE
OPIATES, URINE: NEGATIVE
OXYCODONE: NEGATIVE
PHENCYCLIDINE QUANTITATIVE URINE: NEGATIVE

## 2021-10-15 PROCEDURE — 99283 EMERGENCY DEPT VISIT LOW MDM: CPT

## 2021-10-15 PROCEDURE — 80307 DRUG TEST PRSMV CHEM ANLYZR: CPT

## 2021-10-15 ASSESSMENT — ENCOUNTER SYMPTOMS
VOMITING: 0
COUGH: 0
SHORTNESS OF BREATH: 0
BACK PAIN: 0
ABDOMINAL PAIN: 0

## 2021-10-15 NOTE — ED TRIAGE NOTES
Pt presents to the ED via police escort for drug assessment. Pt was with friend who overdosed. Pt states that he took percocet 5 tonight. Pt appears anxious, pacing, and erratic movements. Pt is alert and oriented times 4 and able to ambulate without any difficulties. Pt states he can't stay long because he had to miss work yesterday because he was in [facility]. Pt respirations and unlabored.

## 2021-10-16 NOTE — ED PROVIDER NOTES
[facility] [address] [address] EMERGENCY DEPT    EMERGENCY MEDICINE     Pt Name: Stefano Oliveira  MRN: [id number]  [place] 1980  Date of evaluation: 10/15/2021  Provider: Phillip Worthington MD    [address] [place] [address]       Chief Complaint   Patient presents with    Drug / Alcohol Assessment       HISTORY OF PRESENT ILLNESS    Stefano Oliveira is a pleasant 39 y.o. male who presents to the emergency department from home after he got a ride from a friend who reportedly overdosed. He reports he was with his friend who they brought in for the overdose, but they told him to come to the ER as well. Pt denies any recent drug use. He reports he just got out of long-term. He admits he used cocaine 3 days ago. He denies any si or hi, denies any other complaints, he reports he feels fine and wants to go. Triage notes and Nursing notes were reviewed by myself. Any discrepancies are addressed above. PAST MEDICAL HISTORY   History reviewed. No pertinent past medical history. SURGICAL HISTORY     History reviewed. No pertinent surgical history. CURRENT MEDICATIONS       Current Discharge Medication List      CONTINUE these medications which have NOT CHANGED    Details   naloxone 4 MG/0.1ML LIQD nasal spray 1 spray by Nasal route as needed for Opioid Reversal  Qty: 1 each, Refills: 0             ALLERGIES     Patient has no known allergies. FAMILY HISTORY     History reviewed. No pertinent family history.      SOCIAL HISTORY       Social History     Socioeconomic History    Marital status: Single     Spouse name: None    Number of children: None    Years of education: None    Highest education level: None   Occupational History    None   Tobacco Use    Smoking status: Current Every Day Smoker     Packs/day: 0.50    Smokeless tobacco: Never Used   Substance and Sexual Activity    Alcohol use: Yes     Comment: 6 24 ounces    Drug use: Yes     Comment: Percocet    Sexual activity: None   Other Topics Concern    None   Social History Narrative    None     Social Determinants of Health     Financial Resource Strain:     Difficulty of Paying Living Expenses:    Food Insecurity:     Worried About Running Out of Food in the Last Year:     [address] in the Last Year:    Transportation Needs:     Lack of Transportation (Medical):  Lack of Transportation (Non-Medical):    Physical Activity:     Days of Exercise per Week:     Minutes of Exercise per Session:    Stress:     Feeling of Stress :    Social Connections:     Frequency of Communication with Friends and Family:     Frequency of Social Gatherings with Friends and Family:     Attends Alevism Services:     Active Member of Clubs or Organizations:     Attends Club or Organization Meetings:     Marital Status:    Intimate Partner Violence:     Fear of Current or Ex-Partner:     Emotionally Abused:     Physically Abused:     Sexually Abused:        REVIEW OF SYSTEMS     Review of Systems   Constitutional: Negative for chills and fever. Respiratory: Negative for cough and shortness of breath. Cardiovascular: Negative for chest pain and leg swelling. Gastrointestinal: Negative for abdominal pain and vomiting. Musculoskeletal: Negative for back pain and neck pain. Skin: Negative for rash and wound. Neurological: Negative for weakness and numbness. All other systems reviewed and are negative. Except as noted above the remainder of the review of systems was reviewed and is. PHYSICAL EXAM    (up to 7 for level 4, 8 or more for level 5)     ED Triage Vitals [10/15/21 1930]   BP Temp Temp Source Pulse Resp SpO2 Height Weight   103/62 98.7 °F (37.1 °C) Oral 109 18 97 % 5' 10\" (1.778 m) 155 lb (70.3 kg)       Physical Exam  Vitals and nursing note reviewed. Constitutional:       General: He is awake. HENT:      Head: Normocephalic and atraumatic.       Mouth/Throat:      Mouth: Mucous membranes are moist.   Eyes:      General: Lids are normal. accordance with patient's wishes. Strict returnprecautions and follow up instructions were discussed with the patient with which the patient agrees        ED Medications administered this visit:  Medications - No data to display      Procedures: (None if blank)       CLINICAL       1.  Encounter for medical screening examination          DISPOSITION/PLAN   DISPOSITION Decision To Discharge 10/15/2021 08:38:33 PM      PATIENT REFERRED TO:  Marty   [phone]  In 2 days        DISCHARGE MEDICATIONS:  Current Discharge Medication List                 (Please note that portions of this note were completed with a voice recognition program.  Efforts were made to edit the dictations but occasionallywords are mis-transcribed.)      Ellie Rosenbaum MD,FACEP (electronically signed)  Attending Physician, Emergency Department       Meche Martinez MD  10/15/21 2039

## 2024-10-11 ENCOUNTER — HOSPITAL ENCOUNTER (EMERGENCY)
Age: 44
Discharge: HOME | End: 2024-10-12
Attending: EMERGENCY MEDICINE
Payer: MEDICAID

## 2024-10-11 DIAGNOSIS — F19.10 SUBSTANCE ABUSE (HCC): Primary | ICD-10-CM

## 2024-10-11 PROCEDURE — 99284 EMERGENCY DEPT VISIT MOD MDM: CPT

## 2024-10-11 ASSESSMENT — PAIN - FUNCTIONAL ASSESSMENT: PAIN_FUNCTIONAL_ASSESSMENT: NONE - DENIES PAIN

## 2024-10-12 VITALS
HEART RATE: 51 BPM | DIASTOLIC BLOOD PRESSURE: 71 MMHG | OXYGEN SATURATION: 99 % | SYSTOLIC BLOOD PRESSURE: 110 MMHG | TEMPERATURE: 98.2 F | RESPIRATION RATE: 18 BRPM

## 2024-10-12 LAB
ANION GAP SERPL CALC-SCNC: 14 MEQ/L (ref 8–16)
ANION GAP SERPL CALC-SCNC: 15 MEQ/L (ref 8–16)
APAP SERPL-MCNC: < 5 UG/ML (ref 0–20)
BASOPHILS ABSOLUTE: 0 THOU/MM3 (ref 0–0.1)
BASOPHILS NFR BLD AUTO: 0.4 %
BUN SERPL-MCNC: 19 MG/DL (ref 7–22)
BUN SERPL-MCNC: 19 MG/DL (ref 7–22)
CALCIUM SERPL-MCNC: 8.3 MG/DL (ref 8.5–10.5)
CALCIUM SERPL-MCNC: 8.9 MG/DL (ref 8.5–10.5)
CHLORIDE SERPL-SCNC: 103 MEQ/L (ref 98–111)
CHLORIDE SERPL-SCNC: 103 MEQ/L (ref 98–111)
CK SERPL-CCNC: 1043 U/L (ref 55–170)
CK SERPL-CCNC: 716 U/L (ref 55–170)
CO2 SERPL-SCNC: 22 MEQ/L (ref 23–33)
CO2 SERPL-SCNC: 25 MEQ/L (ref 23–33)
CREAT SERPL-MCNC: 0.9 MG/DL (ref 0.4–1.2)
CREAT SERPL-MCNC: 1.2 MG/DL (ref 0.4–1.2)
DEPRECATED RDW RBC AUTO: 44.3 FL (ref 35–45)
EKG ATRIAL RATE: 75 BPM
EKG P AXIS: 77 DEGREES
EKG P-R INTERVAL: 128 MS
EKG Q-T INTERVAL: 420 MS
EKG QRS DURATION: 92 MS
EKG QTC CALCULATION (BAZETT): 469 MS
EKG R AXIS: 79 DEGREES
EKG T AXIS: 59 DEGREES
EKG VENTRICULAR RATE: 75 BPM
EOSINOPHIL NFR BLD AUTO: 3.2 %
EOSINOPHILS ABSOLUTE: 0.3 THOU/MM3 (ref 0–0.4)
ERYTHROCYTE [DISTWIDTH] IN BLOOD BY AUTOMATED COUNT: 12.7 % (ref 11.5–14.5)
ETHANOL SERPL-MCNC: < 0.01 % (ref 0–0.08)
GFR SERPL CREATININE-BSD FRML MDRD: 76 ML/MIN/1.73M2
GFR SERPL CREATININE-BSD FRML MDRD: > 90 ML/MIN/1.73M2
GLUCOSE SERPL-MCNC: 73 MG/DL (ref 70–108)
GLUCOSE SERPL-MCNC: 91 MG/DL (ref 70–108)
HCT VFR BLD AUTO: 38.1 % (ref 42–52)
HGB BLD-MCNC: 12.8 GM/DL (ref 14–18)
IMM GRANULOCYTES # BLD AUTO: 0.03 THOU/MM3 (ref 0–0.07)
IMM GRANULOCYTES NFR BLD AUTO: 0.4 %
LYMPHOCYTES ABSOLUTE: 2 THOU/MM3 (ref 1–4.8)
LYMPHOCYTES NFR BLD AUTO: 24.4 %
MCH RBC QN AUTO: 32.1 PG (ref 26–33)
MCHC RBC AUTO-ENTMCNC: 33.6 GM/DL (ref 32.2–35.5)
MCV RBC AUTO: 95.5 FL (ref 80–94)
MONOCYTES ABSOLUTE: 0.9 THOU/MM3 (ref 0.4–1.3)
MONOCYTES NFR BLD AUTO: 10.8 %
NEUTROPHILS ABSOLUTE: 5.1 THOU/MM3 (ref 1.8–7.7)
NEUTROPHILS NFR BLD AUTO: 60.8 %
NRBC BLD AUTO-RTO: 0 /100 WBC
OSMOLALITY SERPL CALC.SUM OF ELEC: 280.2 MOSMOL/KG (ref 275–300)
OSMOLALITY SERPL CALC.SUM OF ELEC: 285 MOSMOL/KG (ref 275–300)
PLATELET # BLD AUTO: 333 THOU/MM3 (ref 130–400)
PMV BLD AUTO: 8.6 FL (ref 9.4–12.4)
POTASSIUM SERPL-SCNC: 4.2 MEQ/L (ref 3.5–5.2)
POTASSIUM SERPL-SCNC: 4.3 MEQ/L (ref 3.5–5.2)
RBC # BLD AUTO: 3.99 MILL/MM3 (ref 4.7–6.1)
SALICYLATES SERPL-MCNC: < 0.3 MG/DL (ref 2–10)
SODIUM SERPL-SCNC: 140 MEQ/L (ref 135–145)
SODIUM SERPL-SCNC: 142 MEQ/L (ref 135–145)
T4 FREE SERPL-MCNC: 1.16 NG/DL (ref 0.93–1.68)
TSH SERPL DL<=0.005 MIU/L-ACNC: 6.92 UIU/ML (ref 0.4–4.2)
WBC # BLD AUTO: 8.4 THOU/MM3 (ref 4.8–10.8)

## 2024-10-12 PROCEDURE — 82077 ASSAY SPEC XCP UR&BREATH IA: CPT

## 2024-10-12 PROCEDURE — 84439 ASSAY OF FREE THYROXINE: CPT

## 2024-10-12 PROCEDURE — 36415 COLL VENOUS BLD VENIPUNCTURE: CPT

## 2024-10-12 PROCEDURE — 6360000002 HC RX W HCPCS: Performed by: EMERGENCY MEDICINE

## 2024-10-12 PROCEDURE — 93005 ELECTROCARDIOGRAM TRACING: CPT | Performed by: EMERGENCY MEDICINE

## 2024-10-12 PROCEDURE — 96374 THER/PROPH/DIAG INJ IV PUSH: CPT

## 2024-10-12 PROCEDURE — 85025 COMPLETE CBC W/AUTO DIFF WBC: CPT

## 2024-10-12 PROCEDURE — 80048 BASIC METABOLIC PNL TOTAL CA: CPT

## 2024-10-12 PROCEDURE — 93010 ELECTROCARDIOGRAM REPORT: CPT | Performed by: INTERNAL MEDICINE

## 2024-10-12 PROCEDURE — 80179 DRUG ASSAY SALICYLATE: CPT

## 2024-10-12 PROCEDURE — 96375 TX/PRO/DX INJ NEW DRUG ADDON: CPT

## 2024-10-12 PROCEDURE — 84443 ASSAY THYROID STIM HORMONE: CPT

## 2024-10-12 PROCEDURE — 80143 DRUG ASSAY ACETAMINOPHEN: CPT

## 2024-10-12 PROCEDURE — 82550 ASSAY OF CK (CPK): CPT

## 2024-10-12 RX ORDER — 0.9 % SODIUM CHLORIDE 0.9 %
1000 INTRAVENOUS SOLUTION INTRAVENOUS ONCE
Status: DISCONTINUED | OUTPATIENT
Start: 2024-10-12 | End: 2024-10-12

## 2024-10-12 RX ORDER — LORAZEPAM 2 MG/ML
2 INJECTION INTRAMUSCULAR ONCE
Status: COMPLETED | OUTPATIENT
Start: 2024-10-12 | End: 2024-10-12

## 2024-10-12 RX ORDER — HALOPERIDOL 5 MG/ML
5 INJECTION INTRAMUSCULAR ONCE
Status: COMPLETED | OUTPATIENT
Start: 2024-10-12 | End: 2024-10-12

## 2024-10-12 RX ORDER — NALOXONE HYDROCHLORIDE 1 MG/ML
1 INJECTION INTRAMUSCULAR; INTRAVENOUS; SUBCUTANEOUS ONCE
Status: COMPLETED | OUTPATIENT
Start: 2024-10-12 | End: 2024-10-12

## 2024-10-12 RX ADMIN — NALOXONE HYDROCHLORIDE 1 MG: 1 INJECTION PARENTERAL at 00:28

## 2024-10-12 RX ADMIN — HALOPERIDOL LACTATE 5 MG: 5 INJECTION, SOLUTION INTRAMUSCULAR at 00:12

## 2024-10-12 RX ADMIN — LORAZEPAM 2 MG: 2 INJECTION INTRAMUSCULAR; INTRAVENOUS at 00:04

## 2024-10-12 ASSESSMENT — PAIN - FUNCTIONAL ASSESSMENT
PAIN_FUNCTIONAL_ASSESSMENT: NONE - DENIES PAIN

## 2024-10-12 NOTE — ED NOTES
Patient resting in bed with eyes closed. Awakes to voice. Provided additional blankets. Side rails up x2. Continues on monitor. Respirations easy and unlabored. No distress noted. Call light within reach.

## 2024-10-12 NOTE — ED TRIAGE NOTES
Patient to ED via EMS after being found unresponsive on the side of the road. [employer] was called.   Once they started talking to patient, he was awake and alert. Patient states he \"popped a pill\" and that he \"should have known better.\"   Patient acting erratic upon arrival and unable to stay still. Dr. Henderson and Dr. Arguelles at bedside.

## 2024-10-12 NOTE — ED PROVIDER NOTES
Signed out to me at shift change 7:28 AM EDT    This patient has been seen and evaluated by previous shift physician, Dr. Henderson.     Please refer to his/her note for detailed history of present illness, physical exam and medical decision making.      I was signed out to follow up ED course.     I have personally seen and evaluated this patient at time of sign-out.     ED COURSE / MEDICAL DECISION MAKING:       Anurag Hammond is a 44 y.o. male who presents to Emergency Department with Addiction Problem    Past medical history significant for meth and opioid abuse.    Patient's laboratory findings show CPK elevation initially at 1043, pending repeat CPK to rule out rhabdomyolysis although at low likelihood.  Repeat CPK trends down.  AVSS upon reassessment.  No SI or HI.   Patient is able to ambulate, eat and drink.     provides outpatient resources for follow-up.  Discharged in stable conditions.    VITALS  Vitals:    10/12/24 0355 10/12/24 0440 10/12/24 0544 10/12/24 0631   BP: 100/60 105/64 (!) 100/56 97/64   Pulse: 60 63 57 52   Resp: 14 12 12 12   Temp:       TempSrc:       SpO2: 98% 96% 93% 97%     LABS  Results for orders placed or performed during the hospital encounter of 10/11/24   Salicylate   Result Value Ref Range    Salicylate Lvl < 0.3 (L) 2.0 - 10.0 mg/dL   Acetaminophen Level   Result Value Ref Range    Acetaminophen Level < 5.0 0.0 - 20.0 ug/mL   TSH   Result Value Ref Range    TSH 6.920 (H) 0.400 - 4.200 uIU/mL   Ethanol   Result Value Ref Range    Ethanol Lvl < 0.01 0.00 %   Basic Metabolic Panel   Result Value Ref Range    Sodium 140 135 - 145 meq/L    Potassium 4.3 3.5 - 5.2 meq/L    Chloride 103 98 - 111 meq/L    CO2 22 (L) 23 - 33 meq/L    Glucose 73 70 - 108 mg/dL    BUN 19 7 - 22 mg/dL    Creatinine 1.2 0.4 - 1.2 mg/dL    Calcium 8.9 8.5 - 10.5 mg/dL   CBC with Auto Differential   Result Value Ref Range    WBC 8.4 4.8 - 10.8 thou/mm3    RBC 3.99 (L) 4.70 - 6.10 mill/mm3

## 2024-10-12 NOTE — ED NOTES
Pt ambulated in hallway and tolerated well. Pt provide with box lunch and water. Respirations unlabored. Denies any needs at this time.

## 2024-10-12 NOTE — ED PROVIDER NOTES
Pt Name: Anurag Hammond  MRN: [id number]  Birthdate 1980  Date of evaluation: 10/11/2024  ED Resident: Mora Arguelles MD  ED Attending: Dr. Henderson    CHIEF COMPLAINT       Chief Complaint   Patient presents with    Addiction Problem     History obtained from unobtainable from patient due to mental status.    HISTORY OF PRESENT ILLNESS    HPI  Anurag Hammond is a 44 y.o. male who presents to the emergency department for evaluation of altered mental status.    Patient was brought in by the police department after being seen wandering the streets.  He is incredibly agitated, yelling, not aggressive but towards himself and not staff.  Repeatedly stating \"I am so mad at myself, I am so mad myself, I cannot believe I'm such an idiot, admits that he did not say, I hate myself\"    Does not answer questions, or cooperate with exam.    The patient has no other acute complaints at this time.    PAST MEDICAL AND SURGICAL HISTORY   History reviewed. No pertinent past medical history.  History reviewed. No pertinent surgical history.    MEDICATIONS   No current facility-administered medications for this encounter.    Current Outpatient Medications:     naloxone 4 MG/0.1ML LIQD nasal spray, 1 spray by Nasal route as needed for Opioid Reversal, Disp: 1 each, Rfl: 0    SOCIAL HISTORY     Social History     Social History Narrative    Not on file     Social History     Tobacco Use    Smoking status: Every Day     Current packs/day: 0.50     Types: Cigarettes    Smokeless tobacco: Never   Substance Use Topics    Alcohol use: Yes     Comment: 6 24 ounces    Drug use: Yes     Comment: Percocet       ALLERGIES   No Known Allergies    FAMILY HISTORY   History reviewed. No pertinent family history.    PHYSICAL EXAM     ED Triage Vitals   BP Systolic BP Percentile Diastolic BP Percentile Temp Temp Source Pulse Respirations SpO2   10/12/24 0029 -- -- 10/11/24 2356 10/11/24 2356 10/11/24 2356 10/11/24 2356 10/11/24 2356   127/84   soon as possible for a visit       [facility] EMERGENCY DEPT  [address] [address]  [phone]  Go to   If symptoms worsen      The results of pertinent diagnostic studies and exam findings were discussed. The patient’s provisional diagnosis and plan of care were discussed with the patient who expressed understanding.     PROCEDURES: (None if blank)  Procedures:     This transcription was electronically signed. Parts of this transcriptions may have been dictated by use of voice recognition software and electronically transcribed, and parts may have been transcribed with the assistance of an ED scribe. The transcription may contain errors not detected in proofreading.    Electronically Signed: Mora Arguelles MD, 10/13/24, 9:23 AM

## 2024-10-12 NOTE — ED NOTES
Patient resting in bed with eyes closed. Awakes to voice. Respirations easy and unlabored. No distress noted. Call light within reach. Side rails up x2.

## 2024-10-12 NOTE — ED NOTES
Patient resting in bed with eyes closed. Vitals reassessed. Respirations easy and unlabored. No distress noted. Call light within reach.

## 2024-10-12 NOTE — PROGRESS NOTES
0856 - Call from RN to give resources to patient  0903 - In to attempt to give patient resource packet. Patient unable to open eyes to react to external stimuli. Left packet; will be in to review at a later time  1122 - Consult with MD Drew on plan of care  1126 - Gave and explained Resource Packet to patient, discussing the importance of outpatient services such as Psychiatry, Counseling, and Case Management. Asked if patient would like Clinician to contact resources. Patient denies. Patient agreed with plan of care.

## 2024-10-12 NOTE — ED NOTES
Patient given narcan and haldol. Patient appears alert and calm after MAR administration. Updated on POC. Placed back onto monitor and given cool washclothes and ice packs due to being diaphoretic. Vitals stable.

## 2024-10-12 NOTE — DISCHARGE INSTRUCTIONS
You were seen in the ED for somnolence after taking a substance.  Please stop taking drugs altogether. Follow-up with resources as provided by counselor as soon as possible for further management.  Also follow-up with family medicine clinic as soon as possible for further evaluation and management.  Return to the ED for any new or worsening symptoms, or any additional concerns.

## 2024-10-12 NOTE — ED PROVIDER NOTES
[facility]  EMERGENCY DEPARTMENT ENCOUNTER   PHYSICIAN ATTESTATION    Pt Name: Anurag Hammond  MRN: [id number]  Birthdate 1980  Date of evaluation: 9/12/20      I personally saw and examined the patient. I have reviewed and agree with the Resident findings, including all diagnostic interpretations and treatment plans as written. I was present for the key portion of any procedures performed and the inclusive time noted in any critical care statement.       History:     Patient was seen with Mora Arguelles, resident physician.    This a 44-year-old male who arrives by squad extremely agitated.  He is jumping around all over the bed.  He is agitated.  Speech is very forced.  He is tachycardic and diaphoretic.  Suggestive of a sympathomimetic toxidrome.  Apparently has a history of combining either cocaine and methamphetamines with opiates.  Apparently does have opiates on board as well.  We initially treated his agitation with Ativan and Haldol but he later became more sedate.  However when he was given Narcan he woke up again.  We are waiting for all of the substances to dry out.  We are observing over the next few hours.  In the morning he will need to be evaluated by mental health.    It looks like he has been seen for a similar presentation about 3 years ago, ended up going home after a long ER stay.    Case will be checked out to the next physician.    Diagnosis: Polysubstance overdose                  DO Beatriz Huang Lawrence, DO  10/12/24 0531

## 2024-10-12 NOTE — ED PROVIDER NOTES
Transfer of Care Note:   Physician Signing out: Mora Arguelles MD  Receiving Physician: Nathan Newell MD  Sign out time: 0600      Brief history:  44M who presents to the ED for evaluation of AMS after taking unknown \"pill\". Patient was brought in by the police department after being seen wandering the streets.  Incredibly agitated, yelling, but not aggressive. Patient was given haldol and ativan initially for severe agitation. Then given Narcan for increased somnolence.    Items pending that need to be checked:  -[facility] resources  -Recheck BMP, CK  -Reassessment      Additional Assessment and results:   I have personally performed a face to face diagnostic evaluation on this patient. The patient's initial evaluation and plan have been discussed with the prior physician who initially evaluated the patient. Nursing Notes, Past Medical Hx, Past Surgical Hx, Social Hx, Allergies, vital signs and Family Hx were all reviewed.      Vitals:    10/12/24 1131   BP: 110/71   Pulse: 51   Resp: 18   Temp:    SpO2: 99%     Physical Exam      Labs Reviewed   SALICYLATE LEVEL - Abnormal; Notable for the following components:       Result Value    Salicylate Lvl < 0.3 (*)     All other components within normal limits   TSH - Abnormal; Notable for the following components:    TSH 6.920 (*)     All other components within normal limits   BASIC METABOLIC PANEL - Abnormal; Notable for the following components:    CO2 22 (*)     All other components within normal limits   CBC WITH AUTO DIFFERENTIAL - Abnormal; Notable for the following components:    RBC 3.99 (*)     Hemoglobin 12.8 (*)     Hematocrit 38.1 (*)     MCV 95.5 (*)     MPV 8.6 (*)     All other components within normal limits   CK - Abnormal; Notable for the following components:    Total CK 1,043 (*)     All other components within normal limits   BASIC METABOLIC PANEL - Abnormal; Notable for the following components:    Calcium 8.3 (*)     All other components within  severe agitation. Then given Narcan for increased somnolence.  Cr and CK improving on recheck with PO intake.  Patient remained stable throughout ED stay.  Up to ambulate without difficulty and eating well in the room.    Plan:   Discharge home with strict return precautions  Substance abuse     Final diagnoses:   Substance abuse (HCC)     Discharge Medication List as of 10/12/2024  1:07 PM          Condition: condition: stable  Dispo: Discharge to home  DISPOSITION Decision To Discharge 10/12/2024 12:50:14 PM      This transcription was electronically signed. It was dictated by use of voice recognition software and electronically transcribed. The transcription may contain errors not detected in proofreading.

## 2024-10-16 NOTE — PROGRESS NOTES
mental health treatment. Resources are provided for addiction concerns. Patient is also provided information to [facility]. Detail Level: Detailed Quality 226: Preventive Care And Screening: Tobacco Use: Screening And Cessation Intervention: Patient screened for tobacco use and is an ex/non-smoker Quality 130: Documentation Of Current Medications In The Medical Record: Current Medications Documented Quality 431: Preventive Care And Screening: Unhealthy Alcohol Use - Screening: Patient not identified as an unhealthy alcohol user when screened for unhealthy alcohol use using a systematic screening method

## 2024-11-27 ENCOUNTER — HOSPITAL ENCOUNTER (EMERGENCY)
Age: 44
Discharge: LEFT BEFORE DISCHARGE | End: 2024-11-27
Attending: EMERGENCY MEDICINE
Payer: MEDICAID

## 2024-11-27 VITALS
HEART RATE: 63 BPM | BODY MASS INDEX: 21.47 KG/M2 | DIASTOLIC BLOOD PRESSURE: 71 MMHG | WEIGHT: 150 LBS | HEIGHT: 70 IN | RESPIRATION RATE: 16 BRPM | OXYGEN SATURATION: 99 % | SYSTOLIC BLOOD PRESSURE: 111 MMHG | TEMPERATURE: 98 F

## 2024-11-27 DIAGNOSIS — R53.83 LISTLESSNESS: Primary | ICD-10-CM

## 2024-11-27 LAB
ANION GAP SERPL CALC-SCNC: 13 MEQ/L (ref 8–16)
BASOPHILS ABSOLUTE: 0 THOU/MM3 (ref 0–0.1)
BASOPHILS NFR BLD AUTO: 0.2 %
BUN SERPL-MCNC: 27 MG/DL (ref 7–22)
CALCIUM SERPL-MCNC: 8.3 MG/DL (ref 8.5–10.5)
CHLORIDE SERPL-SCNC: 100 MEQ/L (ref 98–111)
CO2 SERPL-SCNC: 24 MEQ/L (ref 23–33)
CREAT SERPL-MCNC: 0.8 MG/DL (ref 0.4–1.2)
DEPRECATED RDW RBC AUTO: 45.4 FL (ref 35–45)
EOSINOPHIL NFR BLD AUTO: 0.4 %
EOSINOPHILS ABSOLUTE: 0 THOU/MM3 (ref 0–0.4)
ERYTHROCYTE [DISTWIDTH] IN BLOOD BY AUTOMATED COUNT: 12.7 % (ref 11.5–14.5)
GFR SERPL CREATININE-BSD FRML MDRD: > 90 ML/MIN/1.73M2
GLUCOSE BLD STRIP.AUTO-MCNC: 153 MG/DL (ref 70–108)
GLUCOSE BLD STRIP.AUTO-MCNC: 37 MG/DL (ref 70–108)
GLUCOSE BLD STRIP.AUTO-MCNC: 42 MG/DL (ref 70–108)
GLUCOSE SERPL-MCNC: 144 MG/DL (ref 70–108)
HCT VFR BLD AUTO: 38.6 % (ref 42–52)
HGB BLD-MCNC: 12.5 GM/DL (ref 14–18)
IMM GRANULOCYTES # BLD AUTO: 0.04 THOU/MM3 (ref 0–0.07)
IMM GRANULOCYTES NFR BLD AUTO: 0.4 %
LYMPHOCYTES ABSOLUTE: 1.3 THOU/MM3 (ref 1–4.8)
LYMPHOCYTES NFR BLD AUTO: 12.6 %
MCH RBC QN AUTO: 31.3 PG (ref 26–33)
MCHC RBC AUTO-ENTMCNC: 32.4 GM/DL (ref 32.2–35.5)
MCV RBC AUTO: 96.7 FL (ref 80–94)
MONOCYTES ABSOLUTE: 0.7 THOU/MM3 (ref 0.4–1.3)
MONOCYTES NFR BLD AUTO: 6.7 %
NEUTROPHILS ABSOLUTE: 8.4 THOU/MM3 (ref 1.8–7.7)
NEUTROPHILS NFR BLD AUTO: 79.7 %
NRBC BLD AUTO-RTO: 0 /100 WBC
OSMOLALITY SERPL CALC.SUM OF ELEC: 281.5 MOSMOL/KG (ref 275–300)
PLATELET # BLD AUTO: 331 THOU/MM3 (ref 130–400)
PMV BLD AUTO: 8.9 FL (ref 9.4–12.4)
POTASSIUM SERPL-SCNC: 4 MEQ/L (ref 3.5–5.2)
RBC # BLD AUTO: 3.99 MILL/MM3 (ref 4.7–6.1)
SODIUM SERPL-SCNC: 137 MEQ/L (ref 135–145)
WBC # BLD AUTO: 10.6 THOU/MM3 (ref 4.8–10.8)

## 2024-11-27 PROCEDURE — 80048 BASIC METABOLIC PNL TOTAL CA: CPT

## 2024-11-27 PROCEDURE — 99283 EMERGENCY DEPT VISIT LOW MDM: CPT

## 2024-11-27 PROCEDURE — 85025 COMPLETE CBC W/AUTO DIFF WBC: CPT

## 2024-11-27 PROCEDURE — 36415 COLL VENOUS BLD VENIPUNCTURE: CPT

## 2024-11-27 PROCEDURE — 82948 REAGENT STRIP/BLOOD GLUCOSE: CPT

## 2024-11-27 NOTE — ED NOTES
Repeat glucose 37 after eating meal box, 6 OG, and gatorade, and peanut butter and gram crackers.

## 2024-11-27 NOTE — ED NOTES
Pt presents to the ED with complaints of hypoglycemia. Pt was incarcerated due to public intox. Pt states in the past he has felt his blood sugar drop. Pt state in the past he has felt like his blood sugar has dropped and ate something and felt better. Pt arrived to the ED with GCS 15. Pt state last meal was lunch around 1130. EMS states blood sugar was 47 and was given one oral glucose.

## 2024-11-28 NOTE — ED PROVIDER NOTES
[facility] EMERGENCY DEPT      CHIEF COMPLAINT       Chief Complaint   Patient presents with    Hypoglycemia       Nurses Notes reviewed and I agree except as noted in the HPI.      HISTORY OF PRESENT ILLNESS    Anurag Hammond is a 44 y.o. male who presents with complaint of hyperglycemia, patient said that he felt faint, nursing staff checked his sugar and was running around 42.  Recheck of sugar using sample from phlebotomy, shows a blood sugar in the 150s.  No history of diabetes, not on antidiabetic's.  Patient said that he has no other complaints.  Onset: Acute  Duration: Prior to arrival  Timing: Appears to have resolved  Location of Pain: None  Intesity/severity: 150s on recheck  Modifying Factors: Unknown, patient thinks that he just has not eaten for a long time.  Relieved by;  Previous Episodes;  Tx Before arrival: None    PAST MEDICAL HISTORY    has no past medical history on file.    SURGICAL HISTORY      has no past surgical history on file.    CURRENT MEDICATIONS       Discharge Medication List as of 11/27/2024  7:36 PM        CONTINUE these medications which have NOT CHANGED    Details   naloxone 4 MG/0.1ML LIQD nasal spray 1 spray by Nasal route as needed for Opioid Reversal, Disp-1 each, R-0Print             ALLERGIES     has No Known Allergies.    FAMILY HISTORY     has no family status information on file.    family history is not on file.    SOCIAL HISTORY      reports that he has been smoking. He has never used smokeless tobacco. He reports current alcohol use. He reports current drug use.    PHYSICAL EXAM     INITIAL VITALS:  height is 1.778 m (5' 10\") and weight is 68 kg (150 lb). His oral temperature is 98 °F (36.7 °C). His blood pressure is 111/71 and his pulse is 63. His respiration is 16 and oxygen saturation is 99%.    Physical Exam   Constitutional:  well-developed and well-nourished.   HENT: Head: Normocephalic, atraumatic, Bilateral external ears normal, Oropharynx mosit, No oral

## 2024-11-28 NOTE — ED NOTES
Patient Leaving AMA at this time. Patient states that he has somewhere to be, and feels better. AMA form signed and IV removed.

## 2025-03-19 ENCOUNTER — HOSPITAL ENCOUNTER (EMERGENCY)
Age: 45
Discharge: HOME | End: 2025-03-19
Attending: EMERGENCY MEDICINE
Payer: MEDICAID

## 2025-03-19 ENCOUNTER — APPOINTMENT (OUTPATIENT)
Dept: GENERAL RADIOLOGY | Age: 45
End: 2025-03-19
Payer: MEDICAID

## 2025-03-19 VITALS
HEIGHT: 70 IN | OXYGEN SATURATION: 96 % | RESPIRATION RATE: 13 BRPM | DIASTOLIC BLOOD PRESSURE: 73 MMHG | HEART RATE: 56 BPM | SYSTOLIC BLOOD PRESSURE: 110 MMHG | BODY MASS INDEX: 20.9 KG/M2 | WEIGHT: 146 LBS | TEMPERATURE: 98.3 F

## 2025-03-19 DIAGNOSIS — F19.90 BEHAVIOR CONTROL PROBLEMS ASSOCIATED WITH USE OF RECREATIONAL DRUG: ICD-10-CM

## 2025-03-19 DIAGNOSIS — F19.10 POLYSUBSTANCE ABUSE: Primary | ICD-10-CM

## 2025-03-19 DIAGNOSIS — F91.9 BEHAVIOR CONTROL PROBLEMS ASSOCIATED WITH USE OF RECREATIONAL DRUG: ICD-10-CM

## 2025-03-19 DIAGNOSIS — R74.8 ELEVATED CPK: ICD-10-CM

## 2025-03-19 LAB
ALBUMIN SERPL BCG-MCNC: 4.5 G/DL (ref 3.4–4.9)
ALP SERPL-CCNC: 93 U/L (ref 40–129)
ALT SERPL W/O P-5'-P-CCNC: 47 U/L (ref 10–50)
AMPHETAMINES UR QL SCN: NEGATIVE
ANION GAP SERPL CALC-SCNC: 17 MEQ/L (ref 8–16)
AST SERPL-CCNC: 48 U/L (ref 10–50)
BACTERIA URNS QL MICRO: ABNORMAL /HPF
BARBITURATES UR QL SCN: NEGATIVE
BASOPHILS ABSOLUTE: 0 THOU/MM3 (ref 0–0.1)
BASOPHILS NFR BLD AUTO: 0.4 %
BENZODIAZ UR QL SCN: NEGATIVE
BILIRUB CONJ SERPL-MCNC: 0.2 MG/DL (ref 0–0.2)
BILIRUB SERPL-MCNC: 0.3 MG/DL (ref 0.3–1.2)
BILIRUB UR QL STRIP.AUTO: NEGATIVE
BUN SERPL-MCNC: 26 MG/DL (ref 8–23)
BZE UR QL SCN: POSITIVE
CALCIUM SERPL-MCNC: 9.4 MG/DL (ref 8.6–10)
CANNABINOIDS UR QL SCN: NEGATIVE
CASTS #/AREA URNS LPF: ABNORMAL /LPF
CASTS 2: ABNORMAL /LPF
CHARACTER UR: CLEAR
CHLORIDE SERPL-SCNC: 102 MEQ/L (ref 98–111)
CK SERPL-CCNC: 558 U/L (ref 39–308)
CO2 SERPL-SCNC: 24 MEQ/L (ref 22–29)
COLOR, UA: YELLOW
CREAT SERPL-MCNC: 1 MG/DL (ref 0.7–1.2)
CRYSTALS URNS MICRO: ABNORMAL
DEPRECATED RDW RBC AUTO: 44.3 FL (ref 35–45)
EKG ATRIAL RATE: 93 BPM
EKG P AXIS: 71 DEGREES
EKG P-R INTERVAL: 124 MS
EKG Q-T INTERVAL: 348 MS
EKG QRS DURATION: 88 MS
EKG QTC CALCULATION (BAZETT): 432 MS
EKG R AXIS: 82 DEGREES
EKG T AXIS: 45 DEGREES
EKG VENTRICULAR RATE: 93 BPM
EOSINOPHIL NFR BLD AUTO: 3.1 %
EOSINOPHILS ABSOLUTE: 0.2 THOU/MM3 (ref 0–0.4)
EPITHELIAL CELLS, UA: ABNORMAL /HPF
ERYTHROCYTE [DISTWIDTH] IN BLOOD BY AUTOMATED COUNT: 13 % (ref 11.5–14.5)
ETHANOL SERPL-MCNC: < 0.01 % (ref 0–0.08)
FENTANYL: POSITIVE
GFR SERPL CREATININE-BSD FRML MDRD: > 90 ML/MIN/1.73M2
GLUCOSE SERPL-MCNC: 84 MG/DL (ref 74–109)
GLUCOSE UR QL STRIP.AUTO: NEGATIVE MG/DL
HCT VFR BLD AUTO: 42.1 % (ref 42–52)
HGB BLD-MCNC: 14.2 GM/DL (ref 14–18)
HGB UR QL STRIP.AUTO: NEGATIVE
IMM GRANULOCYTES # BLD AUTO: 0.04 THOU/MM3 (ref 0–0.07)
IMM GRANULOCYTES NFR BLD AUTO: 0.5 %
KETONES UR QL STRIP.AUTO: NEGATIVE
LIPASE SERPL-CCNC: 13 U/L (ref 13–60)
LYMPHOCYTES ABSOLUTE: 1.7 THOU/MM3 (ref 1–4.8)
LYMPHOCYTES NFR BLD AUTO: 22.5 %
MCH RBC QN AUTO: 31.8 PG (ref 26–33)
MCHC RBC AUTO-ENTMCNC: 33.7 GM/DL (ref 32.2–35.5)
MCV RBC AUTO: 94.4 FL (ref 80–94)
MISCELLANEOUS 2: ABNORMAL
MONOCYTES ABSOLUTE: 0.6 THOU/MM3 (ref 0.4–1.3)
MONOCYTES NFR BLD AUTO: 8.1 %
NEUTROPHILS ABSOLUTE: 4.9 THOU/MM3 (ref 1.8–7.7)
NEUTROPHILS NFR BLD AUTO: 65.4 %
NITRITE UR QL STRIP: POSITIVE
NRBC BLD AUTO-RTO: 0 /100 WBC
OPIATES UR QL SCN: POSITIVE
OSMOLALITY SERPL CALC.SUM OF ELEC: 288.9 MOSMOL/KG (ref 275–300)
OXYCODONE: NEGATIVE
PCP UR QL SCN: NEGATIVE
PH UR STRIP.AUTO: 5.5 [PH] (ref 5–9)
PLATELET # BLD AUTO: 361 THOU/MM3 (ref 130–400)
PMV BLD AUTO: 8.7 FL (ref 9.4–12.4)
POTASSIUM SERPL-SCNC: 4.5 MEQ/L (ref 3.5–5.2)
PROT SERPL-MCNC: 7.7 G/DL (ref 6.4–8.3)
PROT UR STRIP.AUTO-MCNC: NEGATIVE MG/DL
RBC # BLD AUTO: 4.46 MILL/MM3 (ref 4.7–6.1)
RBC URINE: ABNORMAL /HPF
RENAL EPI CELLS #/AREA URNS HPF: ABNORMAL /[HPF]
SODIUM SERPL-SCNC: 143 MEQ/L (ref 135–145)
SP GR UR REFRACT.AUTO: 1.01 (ref 1–1.03)
TROPONIN, HIGH SENSITIVITY: 13 NG/L (ref 0–12)
TROPONIN, HIGH SENSITIVITY: 13 NG/L (ref 0–12)
UROBILINOGEN, URINE: 0.2 EU/DL (ref 0–1)
WBC # BLD AUTO: 7.5 THOU/MM3 (ref 4.8–10.8)
WBC #/AREA URNS HPF: ABNORMAL /HPF
WBC #/AREA URNS HPF: NEGATIVE /[HPF]
YEAST LIKE FUNGI URNS QL MICRO: ABNORMAL

## 2025-03-19 PROCEDURE — 80053 COMPREHEN METABOLIC PANEL: CPT

## 2025-03-19 PROCEDURE — 2580000003 HC RX 258: Performed by: EMERGENCY MEDICINE

## 2025-03-19 PROCEDURE — 96360 HYDRATION IV INFUSION INIT: CPT

## 2025-03-19 PROCEDURE — 80307 DRUG TEST PRSMV CHEM ANLYZR: CPT

## 2025-03-19 PROCEDURE — 83690 ASSAY OF LIPASE: CPT

## 2025-03-19 PROCEDURE — 71046 X-RAY EXAM CHEST 2 VIEWS: CPT

## 2025-03-19 PROCEDURE — 96361 HYDRATE IV INFUSION ADD-ON: CPT

## 2025-03-19 PROCEDURE — 99285 EMERGENCY DEPT VISIT HI MDM: CPT

## 2025-03-19 PROCEDURE — 82550 ASSAY OF CK (CPK): CPT

## 2025-03-19 PROCEDURE — 36415 COLL VENOUS BLD VENIPUNCTURE: CPT

## 2025-03-19 PROCEDURE — 82077 ASSAY SPEC XCP UR&BREATH IA: CPT

## 2025-03-19 PROCEDURE — 93005 ELECTROCARDIOGRAM TRACING: CPT | Performed by: EMERGENCY MEDICINE

## 2025-03-19 PROCEDURE — 85025 COMPLETE CBC W/AUTO DIFF WBC: CPT

## 2025-03-19 PROCEDURE — 82248 BILIRUBIN DIRECT: CPT

## 2025-03-19 PROCEDURE — 93010 ELECTROCARDIOGRAM REPORT: CPT | Performed by: INTERNAL MEDICINE

## 2025-03-19 PROCEDURE — 84484 ASSAY OF TROPONIN QUANT: CPT

## 2025-03-19 PROCEDURE — 81001 URINALYSIS AUTO W/SCOPE: CPT

## 2025-03-19 RX ORDER — SODIUM CHLORIDE, SODIUM LACTATE, POTASSIUM CHLORIDE, AND CALCIUM CHLORIDE .6; .31; .03; .02 G/100ML; G/100ML; G/100ML; G/100ML
1000 INJECTION, SOLUTION INTRAVENOUS ONCE
Status: COMPLETED | OUTPATIENT
Start: 2025-03-19 | End: 2025-03-19

## 2025-03-19 RX ORDER — 0.9 % SODIUM CHLORIDE 0.9 %
1000 INTRAVENOUS SOLUTION INTRAVENOUS ONCE
Status: COMPLETED | OUTPATIENT
Start: 2025-03-19 | End: 2025-03-19

## 2025-03-19 RX ADMIN — SODIUM CHLORIDE, POTASSIUM CHLORIDE, SODIUM LACTATE AND CALCIUM CHLORIDE 1000 ML: 600; 310; 30; 20 INJECTION, SOLUTION INTRAVENOUS at 05:19

## 2025-03-19 RX ADMIN — SODIUM CHLORIDE 1000 ML: 9 INJECTION, SOLUTION INTRAVENOUS at 05:15

## 2025-03-19 ASSESSMENT — PAIN - FUNCTIONAL ASSESSMENT
PAIN_FUNCTIONAL_ASSESSMENT: NONE - DENIES PAIN
PAIN_FUNCTIONAL_ASSESSMENT: NONE - DENIES PAIN

## 2025-03-19 ASSESSMENT — HEART SCORE: ECG: NON-SPECIFC REPOLARIZATION DISTURBANCE/LBTB/PM

## 2025-03-19 NOTE — ED NOTES
Pt resting in bed. Vitals assessed. LR continues to infuse. Pt denies any additional needs. Call light in reach.

## 2025-03-19 NOTE — ED NOTES
Upon first contact with patient this RN receives bedside shift report from JANINA Luis. Patient resting in bed. Respirations easy and unlabored. No distress noted. Call light within reach.

## 2025-03-19 NOTE — ED TRIAGE NOTES
Pt to ED via Police c/o agitation. Pt states he was working outside on a house and thinks he was being to loud and the  showed up. Pt states he had a lot of beer and snorted coke. Pt denies any pain. Police state pt needed evaluated. Pt A&O.

## 2025-03-19 NOTE — DISCHARGE INSTRUCTIONS
Return to the Emergency Department immediately if you develop confusion, agitation, chest pain shortness of breath, vomiting , or you have any other concerns.  Please follow up with your primary care doctor in 2-3 days.

## 2025-03-19 NOTE — ED PROVIDER NOTES
mass index is 20.95 kg/m².     Initial vital signs and nursing assessment reviewed and normal.    Physical Exam  Vitals and nursing note reviewed.   Constitutional:       General: He is not in acute distress.     Appearance: Normal appearance. He is not ill-appearing, toxic-appearing or diaphoretic.   HENT:      Head: Normocephalic and atraumatic.   Eyes:      Conjunctiva/sclera: Conjunctivae normal.      Pupils: Pupils are equal, round, and reactive to light.   Cardiovascular:      Rate and Rhythm: Normal rate and regular rhythm.      Pulses: Normal pulses.      Heart sounds: Normal heart sounds.   Pulmonary:      Effort: Pulmonary effort is normal. No respiratory distress.      Breath sounds: Normal breath sounds. No stridor. No wheezing, rhonchi or rales.   Abdominal:      General: Bowel sounds are normal. There is no distension.      Palpations: Abdomen is soft.      Tenderness: There is no abdominal tenderness. There is no right CVA tenderness, left CVA tenderness, guarding or rebound.   Musculoskeletal:         General: No swelling, tenderness, deformity or signs of injury.      Right lower leg: No edema.      Left lower leg: No edema.   Skin:     General: Skin is warm and dry.      Coloration: Skin is not jaundiced or pale.      Findings: No bruising, erythema, lesion or rash.      Comments: Contusions on chest and back   Neurological:      General: No focal deficit present.      Mental Status: He is alert and oriented to person, place, and time. Mental status is at baseline.   Psychiatric:         Mood and Affect: Mood normal.         Behavior: Behavior normal.         Thought Content: Thought content normal.         Judgment: Judgment normal.          FORMAL DIAGNOSTIC RESULTS     RADIOLOGY: Interpretation per the Radiologist below, if available at the time of this note (none if blank):    XR CHEST (2 VW)    (Results Pending)       LABS: (none if blank)  Labs Reviewed   CBC WITH AUTO DIFFERENTIAL   TROPONIN 
disposition plans were discussed and agreed upon by patient and/or their family.         DISPOSITION Ed Observation 03/19/2025 06:52:48 AM   This transcription was electronically signed. It was dictated by use of voice recognition software and electronically transcribed. The transcription may contain errors not detected in proofreading.       Electronically signed by Chasidy Aguilar MD on 3/19/25 at 5:22 AM EDT        Chasidy Aguilar MD  03/19/25 0623    
             (Please note that portions of this note were completed with a voice recognition program.  Efforts were made to edit the dictations but occasionally words are mis-transcribed.)      PANDA MIGUEL DO (electronically signed)  Attending Emergency Physician          Panda Miguel,   03/20/25 0882

## 2025-04-15 ENCOUNTER — APPOINTMENT (OUTPATIENT)
Dept: CT IMAGING | Age: 45
End: 2025-04-15
Payer: MEDICAID

## 2025-04-15 ENCOUNTER — HOSPITAL ENCOUNTER (EMERGENCY)
Age: 45
Discharge: LEFT BEFORE DISCHARGE | End: 2025-04-15
Attending: STUDENT IN AN ORGANIZED HEALTH CARE EDUCATION/TRAINING PROGRAM
Payer: MEDICAID

## 2025-04-15 VITALS
RESPIRATION RATE: 18 BRPM | BODY MASS INDEX: 20.95 KG/M2 | SYSTOLIC BLOOD PRESSURE: 130 MMHG | HEART RATE: 88 BPM | DIASTOLIC BLOOD PRESSURE: 69 MMHG | TEMPERATURE: 98 F | OXYGEN SATURATION: 100 % | WEIGHT: 146 LBS

## 2025-04-15 DIAGNOSIS — F19.920 INTOXICATION BY DRUG, UNCOMPLICATED (HCC): ICD-10-CM

## 2025-04-15 DIAGNOSIS — S21.211A LACERATION OF RIGHT SIDE OF BACK, INITIAL ENCOUNTER: Primary | ICD-10-CM

## 2025-04-15 LAB
ALBUMIN SERPL BCG-MCNC: 4.3 G/DL (ref 3.4–4.9)
ALP SERPL-CCNC: 103 U/L (ref 40–129)
ALT SERPL W/O P-5'-P-CCNC: 52 U/L (ref 10–50)
ANION GAP SERPL CALC-SCNC: 15 MEQ/L (ref 8–16)
AST SERPL-CCNC: 75 U/L (ref 10–50)
BASOPHILS ABSOLUTE: 0 THOU/MM3 (ref 0–0.1)
BASOPHILS NFR BLD AUTO: 0.2 %
BILIRUB CONJ SERPL-MCNC: 0.6 MG/DL (ref 0–0.2)
BILIRUB SERPL-MCNC: 1.1 MG/DL (ref 0.3–1.2)
BUN SERPL-MCNC: 25 MG/DL (ref 8–23)
CALCIUM SERPL-MCNC: 9.4 MG/DL (ref 8.6–10)
CHLORIDE SERPL-SCNC: 101 MEQ/L (ref 98–111)
CO2 SERPL-SCNC: 24 MEQ/L (ref 22–29)
CREAT SERPL-MCNC: 1 MG/DL (ref 0.7–1.2)
DEPRECATED RDW RBC AUTO: 44 FL (ref 35–45)
EOSINOPHIL NFR BLD AUTO: 0.6 %
EOSINOPHILS ABSOLUTE: 0.1 THOU/MM3 (ref 0–0.4)
ERYTHROCYTE [DISTWIDTH] IN BLOOD BY AUTOMATED COUNT: 12.7 % (ref 11.5–14.5)
ETHANOL SERPL-MCNC: < 0.01 % (ref 0–0.08)
GFR SERPL CREATININE-BSD FRML MDRD: > 90 ML/MIN/1.73M2
GLUCOSE SERPL-MCNC: 76 MG/DL (ref 74–109)
HCT VFR BLD AUTO: 41 % (ref 42–52)
HGB BLD-MCNC: 13.9 GM/DL (ref 14–18)
IMM GRANULOCYTES # BLD AUTO: 0.06 THOU/MM3 (ref 0–0.07)
IMM GRANULOCYTES NFR BLD AUTO: 0.4 %
LACTIC ACID, SEPSIS: 1.6 MMOL/L (ref 0.5–1.9)
LYMPHOCYTES ABSOLUTE: 1 THOU/MM3 (ref 1–4.8)
LYMPHOCYTES NFR BLD AUTO: 5.9 %
MAGNESIUM SERPL-MCNC: 2.3 MG/DL (ref 1.6–2.6)
MCH RBC QN AUTO: 31.7 PG (ref 26–33)
MCHC RBC AUTO-ENTMCNC: 33.9 GM/DL (ref 32.2–35.5)
MCV RBC AUTO: 93.6 FL (ref 80–94)
MONOCYTES ABSOLUTE: 1.2 THOU/MM3 (ref 0.4–1.3)
MONOCYTES NFR BLD AUTO: 7.4 %
NEUTROPHILS ABSOLUTE: 14.3 THOU/MM3 (ref 1.8–7.7)
NEUTROPHILS NFR BLD AUTO: 85.5 %
NRBC BLD AUTO-RTO: 0 /100 WBC
OSMOLALITY SERPL CALC.SUM OF ELEC: 282.6 MOSMOL/KG (ref 275–300)
PLATELET # BLD AUTO: 342 THOU/MM3 (ref 130–400)
PMV BLD AUTO: 8.9 FL (ref 9.4–12.4)
POTASSIUM SERPL-SCNC: 4.3 MEQ/L (ref 3.5–5.2)
PROT SERPL-MCNC: 7.1 G/DL (ref 6.4–8.3)
RBC # BLD AUTO: 4.38 MILL/MM3 (ref 4.7–6.1)
SODIUM SERPL-SCNC: 140 MEQ/L (ref 135–145)
TROPONIN, HIGH SENSITIVITY: 17 NG/L (ref 0–12)
WBC # BLD AUTO: 16.7 THOU/MM3 (ref 4.8–10.8)

## 2025-04-15 PROCEDURE — 82077 ASSAY SPEC XCP UR&BREATH IA: CPT

## 2025-04-15 PROCEDURE — 83735 ASSAY OF MAGNESIUM: CPT

## 2025-04-15 PROCEDURE — 82248 BILIRUBIN DIRECT: CPT

## 2025-04-15 PROCEDURE — 6360000002 HC RX W HCPCS

## 2025-04-15 PROCEDURE — 12002 RPR S/N/AX/GEN/TRNK2.6-7.5CM: CPT

## 2025-04-15 PROCEDURE — 12005 RPR S/N/A/GEN/TRK12.6-20.0CM: CPT

## 2025-04-15 PROCEDURE — 36415 COLL VENOUS BLD VENIPUNCTURE: CPT

## 2025-04-15 PROCEDURE — 84484 ASSAY OF TROPONIN QUANT: CPT

## 2025-04-15 PROCEDURE — 85025 COMPLETE CBC W/AUTO DIFF WBC: CPT

## 2025-04-15 PROCEDURE — 83605 ASSAY OF LACTIC ACID: CPT

## 2025-04-15 PROCEDURE — 80053 COMPREHEN METABOLIC PANEL: CPT

## 2025-04-15 PROCEDURE — 99283 EMERGENCY DEPT VISIT LOW MDM: CPT

## 2025-04-15 PROCEDURE — 6370000000 HC RX 637 (ALT 250 FOR IP)

## 2025-04-15 RX ORDER — GINSENG 100 MG
CAPSULE ORAL 3 TIMES DAILY
Status: DISCONTINUED | OUTPATIENT
Start: 2025-04-15 | End: 2025-04-15 | Stop reason: HOSPADM

## 2025-04-15 RX ORDER — GINSENG 100 MG
CAPSULE ORAL 3 TIMES DAILY
Status: DISCONTINUED | OUTPATIENT
Start: 2025-04-15 | End: 2025-04-15

## 2025-04-15 RX ORDER — LIDOCAINE HYDROCHLORIDE AND EPINEPHRINE BITARTRATE 20; .01 MG/ML; MG/ML
INJECTION, SOLUTION SUBCUTANEOUS
Status: COMPLETED
Start: 2025-04-15 | End: 2025-04-15

## 2025-04-15 RX ADMIN — BACITRACIN: 500 OINTMENT TOPICAL at 17:59

## 2025-04-15 RX ADMIN — LIDOCAINE HYDROCHLORIDE,EPINEPHRINE BITARTRATE: 20; .01 INJECTION, SOLUTION INFILTRATION; PERINEURAL at 17:58

## 2025-04-15 NOTE — ED PROVIDER NOTES
[facility]  EMERGENCY DEPARTMENT ENCOUNTER          Pt Name: Anurag Hammond  MRN: [id number]  Birthdate 1980  Date of evaluation: 4/15/2025  Treating Resident Physician: Errol Garrido MD  Supervising Physician: Erick Spicer MD    History obtained from the patient and EMS.     CHIEF COMPLAINT       Chief Complaint   Patient presents with    Laceration       HISTORY OF PRESENT ILLNESS    Anurag Hammond is a 44 y.o. male with a PMH of polysubstance abuse who presents to the emergency department by EMS with complaints of chronic right leg piece of sheet metal.  Patient had an outside 5 cm laceration on the right lower back.  There is noted to be bleeding but no foreign material found on exploration.  Patient also appears very intoxicated and agitated but redirectable.  Patient denies chest pain, SOB, headache, limited range of motion of any extremity.  Review of systems unremarkable except for aforementioned.      Triage notes and Nursing notes were reviewed by myself.  Any discrepancies are addressed above.    PAST MEDICAL HISTORY   History reviewed. No pertinent past medical history.    SURGICAL HISTORY     History reviewed. No pertinent surgical history.    CURRENT MEDICATIONS       Discharge Medication List as of 4/15/2025  7:11 PM        CONTINUE these medications which have NOT CHANGED    Details   naloxone 4 MG/0.1ML LIQD nasal spray 1 spray by Nasal route as needed for Opioid Reversal, Disp-1 each, R-0Print             ALLERGIES     Patient has no known allergies.    FAMILY HISTORY     History reviewed. No pertinent family history.     SOCIAL HISTORY       Social History     Socioeconomic History    Marital status: Single     Spouse name: None    Number of children: None    Years of education: None    Highest education level: None   Tobacco Use    Smoking status: Every Day     Current packs/day: 0.50     Types: Cigarettes    Smokeless tobacco: Never   Substance

## 2025-04-15 NOTE — ED NOTES
Patient presents to the ED via [facility] for concerns of a back laceration that occurred after falling while doing some spring cleaning. Patient is observed to be under the influence of multiple different substances. Patient is unable to sit still. Patient is yelling out involuntarily and jerking. Patient admits to smoking some weed today and only doing meth twice. Patient has a 5cm laceration on the right flank area. VSS. Patient denies any pain at this time.

## 2025-04-15 NOTE — ED NOTES
ED provider in to evaluate and repair 5cm laceration at this time. Patient is able to cooperate and lay still for short periods of time. Patient tolerated well with injectable lidocaine for lac repair of 3 stitches.

## 2025-04-15 NOTE — ED NOTES
Patient wandering around the department, wanting to leave. Patient was escorted out by other staff members with belongings. ED provider updated on patient leaving at this time.

## 2025-04-15 NOTE — ED TRIAGE NOTES
Pt presents to the ED via ambulance with complaints of getting cut by a piece of sheet metal. There is a 2 inch laceration on pt's lower back. Bleeding is controlled. Pt states he was cleaning up his porch when he fell and cut himself on a piece of metal. Pt denies drug use, however is not able to sit still in bed and is talking in circles.